# Patient Record
Sex: MALE | Race: WHITE | NOT HISPANIC OR LATINO
[De-identification: names, ages, dates, MRNs, and addresses within clinical notes are randomized per-mention and may not be internally consistent; named-entity substitution may affect disease eponyms.]

---

## 2018-11-15 PROBLEM — Z00.00 ENCOUNTER FOR PREVENTIVE HEALTH EXAMINATION: Status: ACTIVE | Noted: 2018-11-15

## 2018-11-27 ENCOUNTER — RECORD ABSTRACTING (OUTPATIENT)
Age: 73
End: 2018-11-27

## 2018-11-28 ENCOUNTER — APPOINTMENT (OUTPATIENT)
Dept: CARDIOLOGY | Facility: CLINIC | Age: 73
End: 2018-11-28
Payer: MEDICARE

## 2018-11-28 VITALS
SYSTOLIC BLOOD PRESSURE: 142 MMHG | WEIGHT: 173 LBS | BODY MASS INDEX: 27.8 KG/M2 | OXYGEN SATURATION: 97 % | DIASTOLIC BLOOD PRESSURE: 80 MMHG | HEIGHT: 66 IN | HEART RATE: 86 BPM

## 2018-11-28 LAB
INR PPP: 2.7
PROTHROMBIN TIME COMMENT: NORMAL

## 2018-11-28 PROCEDURE — 99213 OFFICE O/P EST LOW 20 MIN: CPT

## 2018-11-28 PROCEDURE — 85610 PROTHROMBIN TIME: CPT | Mod: QW

## 2018-11-28 RX ORDER — GLIMEPIRIDE 1 MG/1
1 TABLET ORAL DAILY
Refills: 0 | Status: DISCONTINUED | COMMUNITY
End: 2018-08-14

## 2018-11-30 NOTE — ASSESSMENT
[FreeTextEntry1] : Rate control and anticoagulation for atrial fibrillation. Continue current Coumadin dose\par \par BP acceptable. Continue current treatment

## 2018-11-30 NOTE — PHYSICAL EXAM
[Normal Appearance] : normal appearance [Auscultation Breath Sounds / Voice Sounds] : lungs were clear to auscultation bilaterally [Heart Sounds] : normal S1 and S2 [Abnormal Walk] : normal gait [] : no ischemic changes [FreeTextEntry1] : no evidence of bleeding or bruising

## 2018-12-19 ENCOUNTER — APPOINTMENT (OUTPATIENT)
Dept: CARDIOLOGY | Facility: CLINIC | Age: 73
End: 2018-12-19

## 2019-01-16 ENCOUNTER — RECORD ABSTRACTING (OUTPATIENT)
Age: 74
End: 2019-01-16

## 2019-01-16 DIAGNOSIS — Z83.3 FAMILY HISTORY OF DIABETES MELLITUS: ICD-10-CM

## 2019-01-16 DIAGNOSIS — Z86.79 PERSONAL HISTORY OF OTHER DISEASES OF THE CIRCULATORY SYSTEM: ICD-10-CM

## 2019-01-16 DIAGNOSIS — Z87.891 PERSONAL HISTORY OF NICOTINE DEPENDENCE: ICD-10-CM

## 2019-01-16 DIAGNOSIS — R51 HEADACHE: ICD-10-CM

## 2019-01-18 ENCOUNTER — APPOINTMENT (OUTPATIENT)
Dept: CARDIOLOGY | Facility: CLINIC | Age: 74
End: 2019-01-18

## 2019-02-19 ENCOUNTER — RECORD ABSTRACTING (OUTPATIENT)
Age: 74
End: 2019-02-19

## 2019-02-20 ENCOUNTER — APPOINTMENT (OUTPATIENT)
Dept: CARDIOLOGY | Facility: CLINIC | Age: 74
End: 2019-02-20
Payer: MEDICARE

## 2019-02-20 ENCOUNTER — NON-APPOINTMENT (OUTPATIENT)
Age: 74
End: 2019-02-20

## 2019-02-20 VITALS
HEART RATE: 72 BPM | BODY MASS INDEX: 27.48 KG/M2 | SYSTOLIC BLOOD PRESSURE: 142 MMHG | DIASTOLIC BLOOD PRESSURE: 62 MMHG | WEIGHT: 171 LBS | HEIGHT: 66 IN

## 2019-02-20 LAB — INR PPP: 2.7

## 2019-02-20 PROCEDURE — 93000 ELECTROCARDIOGRAM COMPLETE: CPT

## 2019-02-20 PROCEDURE — 99214 OFFICE O/P EST MOD 30 MIN: CPT

## 2019-02-20 PROCEDURE — 85610 PROTHROMBIN TIME: CPT | Mod: QW

## 2019-02-20 RX ORDER — LOSARTAN POTASSIUM 100 MG/1
100 TABLET, FILM COATED ORAL
Refills: 0 | Status: DISCONTINUED | COMMUNITY
End: 2019-02-20

## 2019-02-20 RX ORDER — GLIMEPIRIDE 1 MG/1
1 TABLET ORAL
Refills: 0 | Status: DISCONTINUED | COMMUNITY
End: 2019-02-20

## 2019-02-20 NOTE — PHYSICAL EXAM
[Normal Conjunctiva] : the conjunctiva exhibited no abnormalities [Eyelids - No Xanthelasma] : the eyelids demonstrated no xanthelasmas [Normal Oral Mucosa] : normal oral mucosa [No Oral Pallor] : no oral pallor [No Oral Cyanosis] : no oral cyanosis [Normal Jugular Venous A Waves Present] : normal jugular venous A waves present [Normal Jugular Venous V Waves Present] : normal jugular venous V waves present [No Jugular Venous Randall A Waves] : no jugular venous randall A waves [Respiration, Rhythm And Depth] : normal respiratory rhythm and effort [Exaggerated Use Of Accessory Muscles For Inspiration] : no accessory muscle use [Auscultation Breath Sounds / Voice Sounds] : lungs were clear to auscultation bilaterally [Abdomen Soft] : soft [Abdomen Tenderness] : non-tender [Abdomen Mass (___ Cm)] : no abdominal mass palpated [Abnormal Walk] : normal gait [Gait - Sufficient For Exercise Testing] : the gait was sufficient for exercise testing [Nail Clubbing] : no clubbing of the fingernails [Cyanosis, Localized] : no localized cyanosis [Petechial Hemorrhages (___cm)] : no petechial hemorrhages [Skin Color & Pigmentation] : normal skin color and pigmentation [] : no rash [No Venous Stasis] : no venous stasis [Skin Lesions] : no skin lesions [No Skin Ulcers] : no skin ulcer [No Xanthoma] : no  xanthoma was observed [Oriented To Time, Place, And Person] : oriented to person, place, and time [Affect] : the affect was normal [Mood] : the mood was normal [No Anxiety] : not feeling anxious [FreeTextEntry1] : Normal S1S2. Faint 1/6 systolic murmur left sternal border.

## 2019-02-20 NOTE — REASON FOR VISIT
[FreeTextEntry1] : Mr. Todd Roberts presents for cardiovascular followup examination.\par \par His problem list includes chronic atrial fibrillation, anticoagulation management, hypertension, type 2 diabetes, chronic kidney disease, peripheral vascular disease, valve/echo abnormalities, dyslipidemia, erectile dysfunction.\par \par He has additional medical problems as noted.\par \par His primary care physician is Doctor Leo.\par \par

## 2019-02-20 NOTE — REVIEW OF SYSTEMS
[see HPI] : see HPI [Negative] : Integumentary [Easy Bleeding] : no tendency for easy bleeding [Easy Bruising] : no tendency for easy bruising

## 2019-02-20 NOTE — DISCUSSION/SUMMARY
[FreeTextEntry1] : Atrial fibrillation treated with heart rate control and anticoagulation.\par INR goal 2-3\par Clinically stable.\par \par Hypertension.\par Satisfactory control.\par \par Dyslipidemia\par Completely statin intolerant despite multiple trials and Zetia intolerance.\par His lipid profile is "not bad" and he will focus on therapeutic lifestyle treatment.\par PCSK9 inhibitor therapy is an option to discuss.\par \par Valve/echo abnormalities.\par The last performed in March of 2018. Mitral thickening with mild mitral deficiency. Trivial aortic sclerosis. I anticipate a followup study after his next visit.\par \par Peripheral vascular disease of lower extremities.\par Mild-moderate right infrapopliteal disease and left posterior tibial disease.\par He has both orthopedic and vascular limitations.\par He is anticipating epidural injection. Hopefully he can exercise more. If necessary consider interventional radiology procedures.\par \par Chronic kidney disease.\par He has seen a nephrologist in the past and I recommend that he do so on a yearly basis, coordinated with his primary care physicians. He also has chronic urinary frequency/nocturia and has seen a urologist.\par \par Additional multiple medical problems as noted including diabetes, erectile dysfunction, current orthopedic/spine abnormalities.\par He is anticipating an epidural injection. He has been given instructions to hold his Coumadin for 5 days prior to an epidural. He is done this before satisfactorily. I offered to the patient I would be pleased to speak to the interventional specialist.\par \par

## 2019-02-20 NOTE — HISTORY OF PRESENT ILLNESS
[FreeTextEntry1] : This 73-year-old male patient presents for cardiovascular evaluation. His problem list is as noted above.\par \par

## 2019-02-20 NOTE — ASSESSMENT
[FreeTextEntry1] : EKG 2/20/19. Atrial fibrillation. Controlled. Poor R wave progression. No acute changes.

## 2019-02-25 ENCOUNTER — RX RENEWAL (OUTPATIENT)
Age: 74
End: 2019-02-25

## 2019-03-20 ENCOUNTER — APPOINTMENT (OUTPATIENT)
Dept: CARDIOLOGY | Facility: CLINIC | Age: 74
End: 2019-03-20

## 2019-03-28 ENCOUNTER — APPOINTMENT (OUTPATIENT)
Dept: CARDIOLOGY | Facility: CLINIC | Age: 74
End: 2019-03-28
Payer: MEDICARE

## 2019-03-28 VITALS — SYSTOLIC BLOOD PRESSURE: 130 MMHG | WEIGHT: 164 LBS | DIASTOLIC BLOOD PRESSURE: 80 MMHG | BODY MASS INDEX: 26.47 KG/M2

## 2019-03-28 LAB — INR PPP: 1.8 RATIO

## 2019-03-28 PROCEDURE — 85610 PROTHROMBIN TIME: CPT | Mod: QW

## 2019-03-28 PROCEDURE — 99211 OFF/OP EST MAY X REQ PHY/QHP: CPT

## 2019-04-25 ENCOUNTER — APPOINTMENT (OUTPATIENT)
Dept: CARDIOLOGY | Facility: CLINIC | Age: 74
End: 2019-04-25
Payer: MEDICARE

## 2019-04-25 VITALS
SYSTOLIC BLOOD PRESSURE: 126 MMHG | WEIGHT: 169 LBS | BODY MASS INDEX: 27.28 KG/M2 | DIASTOLIC BLOOD PRESSURE: 80 MMHG | HEART RATE: 70 BPM

## 2019-04-25 LAB — INR PPP: 3.7 RATIO

## 2019-04-25 PROCEDURE — 85610 PROTHROMBIN TIME: CPT | Mod: QW

## 2019-04-25 PROCEDURE — 99213 OFFICE O/P EST LOW 20 MIN: CPT

## 2019-04-25 NOTE — HISTORY OF PRESENT ILLNESS
[FreeTextEntry1] : His problem list includes chronic atrial fibrillation, anticoagulation management, hypertension, type 2 diabetes, chronic kidney disease, peripheral vascular disease, valve/echo abnormalities, dyslipidemia, erectile dysfunction.\par

## 2019-04-25 NOTE — REVIEW OF SYSTEMS
[Feeling Fatigued] : not feeling fatigued [Shortness Of Breath] : no shortness of breath [Lower Ext Edema] : no extremity edema [Chest Pain] : no chest pain [Palpitations] : no palpitations [Cough] : no cough [Joint Pain] : no joint pain [Abdominal Pain] : no abdominal pain [Limb Weakness (Paresis)] : no limb weakness [Numbness (Hypesthesia)] : no numbness [Dizziness] : no dizziness [Easy Bleeding] : no tendency for easy bleeding [Easy Bruising] : a tendency for easy bruising

## 2019-04-25 NOTE — ASSESSMENT
[FreeTextEntry1] : Rate control and anticoagulation for atrial fibrillation. Elevation of INR recommend hold 1 day then decrease to 2.5mg daily.  Recheck in 3 weeks\par \par BP controlled. Continue current treatment

## 2019-04-25 NOTE — PHYSICAL EXAM
[Auscultation Breath Sounds / Voice Sounds] : lungs were clear to auscultation bilaterally [Normal Appearance] : normal appearance [Heart Sounds] : normal S1 and S2 [Abnormal Walk] : normal gait [] : no ischemic changes [FreeTextEntry1] : no evidence of bleeding or bruising

## 2019-04-25 NOTE — REASON FOR VISIT
[Anticoagulation] : anticoagulation [Atrial Fibrillation] : atrial fibrillation [FreeTextEntry1] : On Coumadin for Afib, notes increase bruising the last few days, came in early for INR check\par \par \par

## 2019-05-16 ENCOUNTER — APPOINTMENT (OUTPATIENT)
Dept: CARDIOLOGY | Facility: CLINIC | Age: 74
End: 2019-05-16
Payer: MEDICARE

## 2019-05-16 VITALS — SYSTOLIC BLOOD PRESSURE: 120 MMHG | WEIGHT: 166 LBS | BODY MASS INDEX: 26.79 KG/M2 | DIASTOLIC BLOOD PRESSURE: 70 MMHG

## 2019-05-16 LAB — INR PPP: 1.9 RATIO

## 2019-05-16 PROCEDURE — 99213 OFFICE O/P EST LOW 20 MIN: CPT

## 2019-05-16 PROCEDURE — 85610 PROTHROMBIN TIME: CPT | Mod: QW

## 2019-05-16 NOTE — ASSESSMENT
[FreeTextEntry1] : Rate control and anticoagulation for atrial fibrillation. \par \par INR 1.9 today. Continue 2.5mg daily. Stabilize Vitamin K in diet. Recheck in 3 weeks\par \par BP controlled. Continue current treatment

## 2019-05-16 NOTE — REVIEW OF SYSTEMS
[Easy Bruising] : a tendency for easy bruising [Feeling Fatigued] : not feeling fatigued [Shortness Of Breath] : no shortness of breath [Chest Pain] : no chest pain [Lower Ext Edema] : no extremity edema [Palpitations] : no palpitations [Cough] : no cough [Joint Pain] : no joint pain [Abdominal Pain] : no abdominal pain [Numbness (Hypesthesia)] : no numbness [Dizziness] : no dizziness [Limb Weakness (Paresis)] : no limb weakness [Easy Bleeding] : no tendency for easy bleeding

## 2019-05-16 NOTE — REASON FOR VISIT
[Anticoagulation] : anticoagulation [Atrial Fibrillation] : atrial fibrillation [FreeTextEntry1] : On Coumadin for Afib, came in for INR check. Denies bleeding or bruising.\par \par \par

## 2019-06-05 ENCOUNTER — APPOINTMENT (OUTPATIENT)
Dept: CARDIOLOGY | Facility: CLINIC | Age: 74
End: 2019-06-05
Payer: MEDICARE

## 2019-06-05 VITALS
HEART RATE: 68 BPM | WEIGHT: 167 LBS | DIASTOLIC BLOOD PRESSURE: 70 MMHG | BODY MASS INDEX: 26.95 KG/M2 | SYSTOLIC BLOOD PRESSURE: 130 MMHG

## 2019-06-05 LAB — INR PPP: 1.7 RATIO

## 2019-06-05 PROCEDURE — 85610 PROTHROMBIN TIME: CPT | Mod: QW

## 2019-06-05 PROCEDURE — 99213 OFFICE O/P EST LOW 20 MIN: CPT

## 2019-06-05 NOTE — REASON FOR VISIT
[Anticoagulation] : anticoagulation [Atrial Fibrillation] : atrial fibrillation [FreeTextEntry1] : On Coumadin for Afib, here for INR check. Denies bleeding or bruising.\par \par \par

## 2019-06-05 NOTE — REVIEW OF SYSTEMS
[Feeling Fatigued] : not feeling fatigued [Shortness Of Breath] : no shortness of breath [Chest Pain] : no chest pain [Lower Ext Edema] : no extremity edema [Palpitations] : no palpitations [Cough] : no cough [Abdominal Pain] : no abdominal pain [Limb Weakness (Paresis)] : no limb weakness [Joint Pain] : no joint pain [Dizziness] : no dizziness [Numbness (Hypesthesia)] : no numbness [Easy Bleeding] : no tendency for easy bleeding [Easy Bruising] : no tendency for easy bruising

## 2019-06-05 NOTE — ASSESSMENT
[FreeTextEntry1] : Rate control and anticoagulation for atrial fibrillation. \par \par INR 1.7 today, return to previous dose of 5mg 1d 2.5mg 6d, Vitamin K intake discussed\par \par BP controlled. Continue current treatment

## 2019-06-24 ENCOUNTER — APPOINTMENT (OUTPATIENT)
Dept: CARDIOLOGY | Facility: CLINIC | Age: 74
End: 2019-06-24

## 2019-09-06 ENCOUNTER — APPOINTMENT (OUTPATIENT)
Dept: CARDIOLOGY | Facility: CLINIC | Age: 74
End: 2019-09-06
Payer: MEDICARE

## 2019-09-06 ENCOUNTER — NON-APPOINTMENT (OUTPATIENT)
Age: 74
End: 2019-09-06

## 2019-09-06 VITALS
HEART RATE: 93 BPM | HEIGHT: 66 IN | DIASTOLIC BLOOD PRESSURE: 68 MMHG | SYSTOLIC BLOOD PRESSURE: 118 MMHG | BODY MASS INDEX: 26.36 KG/M2 | WEIGHT: 164 LBS

## 2019-09-06 LAB — INR PPP: 3.7 RATIO

## 2019-09-06 PROCEDURE — 99214 OFFICE O/P EST MOD 30 MIN: CPT

## 2019-09-06 PROCEDURE — 93000 ELECTROCARDIOGRAM COMPLETE: CPT

## 2019-09-06 PROCEDURE — 85610 PROTHROMBIN TIME: CPT | Mod: QW

## 2019-09-06 NOTE — ASSESSMENT
[FreeTextEntry1] : EKG 9/6/2019.  Atrial fibrillation.  Controlled.  No acute changes.\par EKG 2/20/19. Atrial fibrillation. Controlled. Poor R wave progression. No acute changes.

## 2019-09-06 NOTE — DISCUSSION/SUMMARY
[FreeTextEntry1] : Brief recommendations and follow-up: (see above for details)\par \par Continue current cardiac routine.\par Anticoagulation management, recheck in 2 weeks.  Refer to flowsheet.\par Echocardiogram ordered.\par At the patient's request I ordered comprehensive blood work.

## 2019-09-06 NOTE — HISTORY OF PRESENT ILLNESS
[FreeTextEntry1] : This 74 year-old male patient presents for cardiovascular evaluation.\par \par His problem list is as noted above.\par \par Since his last examination he reports no major events or hospitalizations.  He is active and exercising regularly.  There are no acute symptoms of shortness of breath, chest discomfort, palpitation, lightheadedness.\par \par He is followed regularly by our nurse practitioners for anticoagulation management.\par

## 2019-09-06 NOTE — REASON FOR VISIT
[FreeTextEntry1] : Mr. TIAN TOBIAS has the following problem list.\par \par Atrial fibrillation\par Anticoagulation management\par Hypertension\par Type 2 diabetes\par Chronic kidney disease\par Peripheral vascular disease\par Echo/valve abnormalities\par Dyslipidemia\par Erectile dysfunction\par \par He has additional medical problems as noted.\par \par His primary care physician is Doctor Leo.\par \par

## 2019-09-09 ENCOUNTER — RX RENEWAL (OUTPATIENT)
Age: 74
End: 2019-09-09

## 2019-09-13 ENCOUNTER — LABORATORY RESULT (OUTPATIENT)
Age: 74
End: 2019-09-13

## 2019-09-16 ENCOUNTER — RESULT REVIEW (OUTPATIENT)
Age: 74
End: 2019-09-16

## 2019-09-20 ENCOUNTER — APPOINTMENT (OUTPATIENT)
Dept: CARDIOLOGY | Facility: CLINIC | Age: 74
End: 2019-09-20
Payer: MEDICARE

## 2019-09-20 LAB — INR PPP: 1.8 RATIO

## 2019-09-20 PROCEDURE — 85610 PROTHROMBIN TIME: CPT | Mod: QW

## 2019-09-20 NOTE — REASON FOR VISIT
[Anticoagulation] : anticoagulation [Atrial Fibrillation] : atrial fibrillation [FreeTextEntry1] : At last visit, Mr. Roberts's INR was supratherapeutic, 3.7. Coumadin dose adjusted to 2.5mg daily. Returns for repeat INR. \par \par Reports that he held last night's Coumadin after cutting his foot while doing yard work. He had some bleeding from the cut which resolved. He did not seek medical attention. \par \par Reports easy bruising. Denies hematuria, hemoptysis or other signs of bleeding. Denies chest pain, SOB, palpitations, dizziness or syncope.

## 2019-09-20 NOTE — PHYSICAL EXAM
[Well Groomed] : well groomed [General Appearance - In No Acute Distress] : no acute distress [Edema] : no peripheral edema present [Cyanosis, Localized] : no localized cyanosis [Abnormal Walk] : normal gait [FreeTextEntry1] : Right foot no s/s of bleeding  [Oriented To Time, Place, And Person] : oriented to person, place, and time [] : no rash [Impaired Insight] : insight and judgment were intact [Affect] : the affect was normal

## 2019-09-20 NOTE — HISTORY OF PRESENT ILLNESS
[FreeTextEntry1] : 74 year old male with chronic atrial fibrillation on Coumadin, hypertension, type 2 diabetes, chronic kidney disease, peripheral vascular disease, dyslipidemia, erectile dysfunction.\par

## 2019-10-07 ENCOUNTER — APPOINTMENT (OUTPATIENT)
Dept: CARDIOLOGY | Facility: CLINIC | Age: 74
End: 2019-10-07

## 2019-10-09 ENCOUNTER — RESULT REVIEW (OUTPATIENT)
Age: 74
End: 2019-10-09

## 2019-10-16 ENCOUNTER — APPOINTMENT (OUTPATIENT)
Dept: CARDIOLOGY | Facility: CLINIC | Age: 74
End: 2019-10-16
Payer: MEDICARE

## 2019-10-16 VITALS
HEART RATE: 68 BPM | DIASTOLIC BLOOD PRESSURE: 60 MMHG | WEIGHT: 167 LBS | SYSTOLIC BLOOD PRESSURE: 110 MMHG | BODY MASS INDEX: 26.95 KG/M2

## 2019-10-16 LAB — INR PPP: 2.3 RATIO

## 2019-10-16 PROCEDURE — 99213 OFFICE O/P EST LOW 20 MIN: CPT

## 2019-10-16 PROCEDURE — 85610 PROTHROMBIN TIME: CPT | Mod: QW

## 2019-10-16 NOTE — REVIEW OF SYSTEMS
[Feeling Fatigued] : not feeling fatigued [Shortness Of Breath] : no shortness of breath [Chest Pain] : no chest pain [Lower Ext Edema] : no extremity edema [Palpitations] : no palpitations [Cough] : no cough [Abdominal Pain] : no abdominal pain [Joint Pain] : no joint pain [Limb Weakness (Paresis)] : no limb weakness [Dizziness] : no dizziness [Numbness (Hypesthesia)] : no numbness [Easy Bleeding] : no tendency for easy bleeding [Easy Bruising] : no tendency for easy bruising

## 2019-10-16 NOTE — PHYSICAL EXAM
[Normal Appearance] : normal appearance [Auscultation Breath Sounds / Voice Sounds] : lungs were clear to auscultation bilaterally [Heart Sounds] : normal S1 and S2 [Abnormal Walk] : normal gait [FreeTextEntry1] : no evidence of bleeding or bruising

## 2019-11-13 ENCOUNTER — APPOINTMENT (OUTPATIENT)
Dept: CARDIOLOGY | Facility: CLINIC | Age: 74
End: 2019-11-13

## 2019-12-06 ENCOUNTER — APPOINTMENT (OUTPATIENT)
Dept: CARDIOLOGY | Facility: CLINIC | Age: 74
End: 2019-12-06
Payer: MEDICARE

## 2019-12-06 VITALS
BODY MASS INDEX: 27.44 KG/M2 | DIASTOLIC BLOOD PRESSURE: 60 MMHG | SYSTOLIC BLOOD PRESSURE: 120 MMHG | WEIGHT: 170 LBS | HEART RATE: 67 BPM

## 2019-12-06 LAB — INR PPP: 2.6 RATIO

## 2019-12-06 PROCEDURE — 99213 OFFICE O/P EST LOW 20 MIN: CPT

## 2019-12-06 PROCEDURE — 85610 PROTHROMBIN TIME: CPT | Mod: QW

## 2019-12-06 NOTE — REVIEW OF SYSTEMS
[Shortness Of Breath] : no shortness of breath [Feeling Fatigued] : not feeling fatigued [Lower Ext Edema] : no extremity edema [Chest Pain] : no chest pain [Abdominal Pain] : no abdominal pain [Palpitations] : no palpitations [Cough] : no cough [Limb Weakness (Paresis)] : no limb weakness [Joint Pain] : no joint pain [Dizziness] : no dizziness [Easy Bruising] : no tendency for easy bruising [Easy Bleeding] : no tendency for easy bleeding [Numbness (Hypesthesia)] : no numbness

## 2019-12-06 NOTE — REASON FOR VISIT
[FreeTextEntry1] : On Coumadin for Afib, here for INR check. Denies bleeding or bruising.\par \par \par  [Anticoagulation] : anticoagulation [Atrial Fibrillation] : atrial fibrillation

## 2020-01-07 ENCOUNTER — APPOINTMENT (OUTPATIENT)
Dept: CARDIOLOGY | Facility: CLINIC | Age: 75
End: 2020-01-07
Payer: MEDICARE

## 2020-01-07 VITALS
DIASTOLIC BLOOD PRESSURE: 70 MMHG | SYSTOLIC BLOOD PRESSURE: 136 MMHG | BODY MASS INDEX: 26.72 KG/M2 | HEART RATE: 99 BPM | WEIGHT: 166.25 LBS | HEIGHT: 66 IN | OXYGEN SATURATION: 98 %

## 2020-01-07 LAB — INR PPP: 2.1 RATIO

## 2020-01-07 PROCEDURE — 85610 PROTHROMBIN TIME: CPT | Mod: QW

## 2020-01-07 PROCEDURE — 99213 OFFICE O/P EST LOW 20 MIN: CPT

## 2020-01-07 NOTE — REASON FOR VISIT
[Anticoagulation] : anticoagulation [Atrial Fibrillation] : atrial fibrillation [FreeTextEntry1] : On Coumadin for atrial fibrillation. Mr. Roberts reports easy bruising. He denies s/s of bleeding. No c/o chest pain, SOB, palpitations, dizziness or syncope.

## 2020-01-07 NOTE — REVIEW OF SYSTEMS
[Easy Bleeding] : a tendency for easy bleeding [Easy Bruising] : a tendency for easy bruising [Headache] : no headache [Fever] : no fever [Recent Weight Gain (___ Lbs)] : no recent weight gain [Chills] : no chills [Feeling Fatigued] : not feeling fatigued [Recent Weight Loss (___ Lbs)] : no recent weight loss [Dyspnea on exertion] : not dyspnea during exertion [Shortness Of Breath] : no shortness of breath [Chest  Pressure] : no chest pressure [Chest Pain] : no chest pain [Lower Ext Edema] : no extremity edema [Palpitations] : no palpitations [Cough] : no cough [Muscle Cramps] : no muscle cramps [Skin: A Rash] : no rash: [Anxiety] : no anxiety

## 2020-01-07 NOTE — PHYSICAL EXAM
[Well Groomed] : well groomed [General Appearance - In No Acute Distress] : no acute distress [Edema] : no peripheral edema present [Abnormal Walk] : normal gait [] : no rash [Oriented To Time, Place, And Person] : oriented to person, place, and time [Impaired Insight] : insight and judgment were intact [Affect] : the affect was normal [General Appearance - Well Developed] : well developed [Normal Appearance] : normal appearance [General Appearance - Well Nourished] : well nourished [Auscultation Breath Sounds / Voice Sounds] : lungs were clear to auscultation bilaterally [Irregularly Irregular] : the rhythm was irregularly irregular [Cyanosis, Localized] : no localized cyanosis

## 2020-02-05 ENCOUNTER — APPOINTMENT (OUTPATIENT)
Dept: CARDIOLOGY | Facility: CLINIC | Age: 75
End: 2020-02-05
Payer: MEDICARE

## 2020-02-05 VITALS
DIASTOLIC BLOOD PRESSURE: 70 MMHG | BODY MASS INDEX: 26.95 KG/M2 | HEART RATE: 74 BPM | WEIGHT: 167 LBS | SYSTOLIC BLOOD PRESSURE: 142 MMHG

## 2020-02-05 LAB — INR PPP: 2.6 RATIO

## 2020-02-05 PROCEDURE — 99213 OFFICE O/P EST LOW 20 MIN: CPT

## 2020-02-05 PROCEDURE — 85610 PROTHROMBIN TIME: CPT | Mod: QW

## 2020-02-05 NOTE — REASON FOR VISIT
[Atrial Fibrillation] : atrial fibrillation [Anticoagulation] : anticoagulation [FreeTextEntry1] : On Coumadin for Afib, here for INR check. Denies bleeding or bruising.\par \par \par

## 2020-02-05 NOTE — REVIEW OF SYSTEMS
[Feeling Fatigued] : not feeling fatigued [Shortness Of Breath] : no shortness of breath [Lower Ext Edema] : no extremity edema [Palpitations] : no palpitations [Chest Pain] : no chest pain [Cough] : no cough [Abdominal Pain] : no abdominal pain [Joint Pain] : no joint pain [Numbness (Hypesthesia)] : no numbness [Dizziness] : no dizziness [Limb Weakness (Paresis)] : no limb weakness [Easy Bleeding] : no tendency for easy bleeding [Easy Bruising] : no tendency for easy bruising

## 2020-03-09 ENCOUNTER — NON-APPOINTMENT (OUTPATIENT)
Age: 75
End: 2020-03-09

## 2020-03-09 ENCOUNTER — APPOINTMENT (OUTPATIENT)
Dept: CARDIOLOGY | Facility: CLINIC | Age: 75
End: 2020-03-09
Payer: MEDICARE

## 2020-03-09 VITALS
BODY MASS INDEX: 26.68 KG/M2 | SYSTOLIC BLOOD PRESSURE: 110 MMHG | WEIGHT: 166 LBS | DIASTOLIC BLOOD PRESSURE: 70 MMHG | HEIGHT: 66 IN | HEART RATE: 81 BPM

## 2020-03-09 LAB — INR PPP: 2.7 RATIO

## 2020-03-09 PROCEDURE — 93000 ELECTROCARDIOGRAM COMPLETE: CPT

## 2020-03-09 PROCEDURE — 99214 OFFICE O/P EST MOD 30 MIN: CPT

## 2020-03-09 NOTE — HISTORY OF PRESENT ILLNESS
[FreeTextEntry1] : This 74 year-old male patient presents for cardiovascular evaluation.\par \par His problem list is as noted above.\par \par Since his last examination 6 months ago he reports some medical interactions.  He has seen his primary care physician.  He is also seen a neurologist for left-sided sciatica.  Interventions are being considered.  He also seeing an orthopedic surgeon for right greater than left shoulder symptoms.\par \par Otherwise the patient is generally felt well.  He tries to stay active.  There have been no acute symptoms of shortness of breath, chest discomfort, palpitation, lightheadedness.\par \par He has been seen routinely in our office for anticoagulation management.\par

## 2020-03-09 NOTE — ASSESSMENT
[FreeTextEntry1] : EKG 3/9/2020.  Atrial fibrillation, controlled.  No acute changes.\par EKG 9/6/2019.  Atrial fibrillation.  Controlled.  No acute changes.\par EKG 2/20/19. Atrial fibrillation. Controlled. Poor R wave progression. No acute changes.

## 2020-03-09 NOTE — DISCUSSION/SUMMARY
[FreeTextEntry1] : Brief recommendations and follow-up: (see above for details)\par \par The patient's overall cardiovascular status is stable.\par His current limitations are associated with likely disc disease and left-sided sciatica.\par Continue risk factor reduction\par No change in anticoagulation regimen\par Next full cardiology visit 6 months\par 1 month nurse practitioner visit with INR\par I recommend that he speak with his primary care physician regarding a formal nephrology consultation.

## 2020-03-09 NOTE — PHYSICAL EXAM
[Normal Conjunctiva] : the conjunctiva exhibited no abnormalities [Normal Oral Mucosa] : normal oral mucosa [Eyelids - No Xanthelasma] : the eyelids demonstrated no xanthelasmas [No Oral Cyanosis] : no oral cyanosis [Normal Jugular Venous A Waves Present] : normal jugular venous A waves present [No Oral Pallor] : no oral pallor [Normal Jugular Venous V Waves Present] : normal jugular venous V waves present [No Jugular Venous Randall A Waves] : no jugular venous randall A waves [Respiration, Rhythm And Depth] : normal respiratory rhythm and effort [Exaggerated Use Of Accessory Muscles For Inspiration] : no accessory muscle use [Auscultation Breath Sounds / Voice Sounds] : lungs were clear to auscultation bilaterally [Abdomen Soft] : soft [Abdomen Tenderness] : non-tender [Abnormal Walk] : normal gait [Gait - Sufficient For Exercise Testing] : the gait was sufficient for exercise testing [Abdomen Mass (___ Cm)] : no abdominal mass palpated [Nail Clubbing] : no clubbing of the fingernails [Cyanosis, Localized] : no localized cyanosis [Petechial Hemorrhages (___cm)] : no petechial hemorrhages [Skin Color & Pigmentation] : normal skin color and pigmentation [Skin Lesions] : no skin lesions [No Skin Ulcers] : no skin ulcer [No Venous Stasis] : no venous stasis [] : no rash [Oriented To Time, Place, And Person] : oriented to person, place, and time [No Xanthoma] : no  xanthoma was observed [Affect] : the affect was normal [Mood] : the mood was normal [No Anxiety] : not feeling anxious [FreeTextEntry1] : Normal S1S2. Faint 1/6 systolic murmur left sternal border.

## 2020-04-08 ENCOUNTER — APPOINTMENT (OUTPATIENT)
Dept: CARDIOLOGY | Facility: CLINIC | Age: 75
End: 2020-04-08
Payer: MEDICARE

## 2020-04-08 ENCOUNTER — APPOINTMENT (OUTPATIENT)
Dept: CARDIOLOGY | Facility: CLINIC | Age: 75
End: 2020-04-08

## 2020-04-08 VITALS
BODY MASS INDEX: 25.82 KG/M2 | OXYGEN SATURATION: 97 % | HEART RATE: 103 BPM | SYSTOLIC BLOOD PRESSURE: 180 MMHG | WEIGHT: 160 LBS | DIASTOLIC BLOOD PRESSURE: 98 MMHG

## 2020-04-08 LAB — INR PPP: 1.8 RATIO

## 2020-04-08 PROCEDURE — 99213 OFFICE O/P EST LOW 20 MIN: CPT

## 2020-04-08 PROCEDURE — 85610 PROTHROMBIN TIME: CPT | Mod: QW

## 2020-04-08 NOTE — REVIEW OF SYSTEMS
[Palpitations] : palpitations [Negative] : Gastrointestinal [Recent Weight Gain (___ Lbs)] : no recent weight gain [Recent Weight Loss (___ Lbs)] : no recent weight loss [Shortness Of Breath] : no shortness of breath [Chest Pain] : no chest pain [Lower Ext Edema] : no extremity edema [Cough] : no cough [Abdominal Pain] : no abdominal pain [Joint Pain] : no joint pain [Limb Weakness (Paresis)] : no limb weakness [Dizziness] : no dizziness [Numbness (Hypesthesia)] : no numbness [Easy Bleeding] : no tendency for easy bleeding [Easy Bruising] : no tendency for easy bruising

## 2020-04-08 NOTE — REASON FOR VISIT
[Anticoagulation] : anticoagulation [Atrial Fibrillation] : atrial fibrillation [FreeTextEntry1] : Called office with complaint of increased heart rate, particularly at night.  Thinks BP is high, a lot of stress at home.  On top of COVID pandemic son is at [facility] with septicemia (non covid).  No chest pain or SOB\par On Coumadin for Afib,. Denies bleeding or bruising.\par \par \par

## 2020-04-27 ENCOUNTER — APPOINTMENT (OUTPATIENT)
Dept: CARDIOLOGY | Facility: CLINIC | Age: 75
End: 2020-04-27
Payer: MEDICARE

## 2020-04-27 VITALS — OXYGEN SATURATION: 98 % | HEART RATE: 90 BPM | SYSTOLIC BLOOD PRESSURE: 180 MMHG | DIASTOLIC BLOOD PRESSURE: 80 MMHG

## 2020-04-27 LAB — INR PPP: 3.2 RATIO

## 2020-04-27 PROCEDURE — 99213 OFFICE O/P EST LOW 20 MIN: CPT

## 2020-04-27 PROCEDURE — 85610 PROTHROMBIN TIME: CPT | Mod: QW

## 2020-04-27 NOTE — REASON FOR VISIT
[Anticoagulation] : anticoagulation [Hypertension] : hypertension [Atrial Fibrillation] : atrial fibrillation [FreeTextEntry1] : Previously called office with complaint of increased heart rate, particularly at night. a lot of stress at home.  On top of COVID pandemic son is at [facility] with septicemia (non covid). BP was levated last visit and , metoprolol 25mg added.  States he felt better with medication.  BP readings at home were improved, today feels stressed.  No chest pain or SOB\par On Coumadin for Afib,. Denies bleeding or bruising.\par \par \par

## 2020-04-27 NOTE — PHYSICAL EXAM
[Respiration, Rhythm And Depth] : normal respiratory rhythm and effort [Normal Appearance] : normal appearance [Heart Sounds] : normal S1 and S2 [Abnormal Walk] : normal gait [FreeTextEntry1] : no evidence of bleeding or bruising

## 2020-04-27 NOTE — REVIEW OF SYSTEMS
[Negative] : Gastrointestinal [Recent Weight Gain (___ Lbs)] : no recent weight gain [Shortness Of Breath] : no shortness of breath [Recent Weight Loss (___ Lbs)] : no recent weight loss [Palpitations] : no palpitations [Lower Ext Edema] : no extremity edema [Chest Pain] : no chest pain [Abdominal Pain] : no abdominal pain [Cough] : no cough [Limb Weakness (Paresis)] : no limb weakness [Joint Pain] : no joint pain [Numbness (Hypesthesia)] : no numbness [Dizziness] : no dizziness [Easy Bleeding] : no tendency for easy bleeding [Easy Bruising] : no tendency for easy bruising

## 2020-05-01 ENCOUNTER — RX CHANGE (OUTPATIENT)
Age: 75
End: 2020-05-01

## 2020-05-04 ENCOUNTER — RX CHANGE (OUTPATIENT)
Age: 75
End: 2020-05-04

## 2020-05-26 ENCOUNTER — TRANSCRIPTION ENCOUNTER (OUTPATIENT)
Age: 75
End: 2020-05-26

## 2020-05-27 ENCOUNTER — TRANSCRIPTION ENCOUNTER (OUTPATIENT)
Age: 75
End: 2020-05-27

## 2020-05-29 ENCOUNTER — APPOINTMENT (OUTPATIENT)
Dept: CARDIOLOGY | Facility: CLINIC | Age: 75
End: 2020-05-29
Payer: MEDICARE

## 2020-05-29 VITALS
HEART RATE: 92 BPM | SYSTOLIC BLOOD PRESSURE: 138 MMHG | OXYGEN SATURATION: 98 % | WEIGHT: 157 LBS | DIASTOLIC BLOOD PRESSURE: 70 MMHG | BODY MASS INDEX: 25.34 KG/M2

## 2020-05-29 LAB — INR PPP: 1.9 RATIO

## 2020-05-29 PROCEDURE — 99213 OFFICE O/P EST LOW 20 MIN: CPT

## 2020-05-29 PROCEDURE — 85610 PROTHROMBIN TIME: CPT | Mod: QW

## 2020-05-29 NOTE — REASON FOR VISIT
[Anticoagulation] : anticoagulation [FreeTextEntry1] : Comes today for repeat INR, he went to hospital with infected hemorrhoid  INR was 4, he held coumadin 2 days this week.  He is feeling after hemorrhoid evacuated. No antibiotics just suppository.  States his BP was low when checked\par \par Denies CP/SOB/palps  \par

## 2020-05-29 NOTE — REVIEW OF SYSTEMS
[Recent Weight Gain (___ Lbs)] : no recent weight gain [Recent Weight Loss (___ Lbs)] : no recent weight loss [Shortness Of Breath] : no shortness of breath [Chest Pain] : no chest pain [Lower Ext Edema] : no extremity edema [Palpitations] : no palpitations [Cough] : no cough [Abdominal Pain] : no abdominal pain [Joint Pain] : no joint pain [Limb Weakness (Paresis)] : no limb weakness [Dizziness] : no dizziness [Numbness (Hypesthesia)] : no numbness [Easy Bleeding] : no tendency for easy bleeding [Easy Bruising] : no tendency for easy bruising [Negative] : Gastrointestinal

## 2020-05-29 NOTE — PHYSICAL EXAM
[Normal Appearance] : normal appearance [Respiration, Rhythm And Depth] : normal respiratory rhythm and effort [Auscultation Breath Sounds / Voice Sounds] : lungs were clear to auscultation bilaterally [Heart Sounds] : normal S1 and S2 [Abnormal Walk] : normal gait [FreeTextEntry1] : no evidence of bleeding or bruising [Skin Color & Pigmentation] : normal skin color and pigmentation

## 2020-06-12 NOTE — REVIEW OF SYSTEMS
[Feeling Fatigued] : not feeling fatigued [Shortness Of Breath] : no shortness of breath [Chest Pain] : no chest pain [Lower Ext Edema] : no extremity edema [Palpitations] : no palpitations [Cough] : no cough [Abdominal Pain] : no abdominal pain [Joint Pain] : no joint pain [Limb Weakness (Paresis)] : no limb weakness [Dizziness] : no dizziness [Numbness (Hypesthesia)] : no numbness [Easy Bleeding] : no tendency for easy bleeding [Easy Bruising] : no tendency for easy bruising surgical bed

## 2020-06-15 ENCOUNTER — APPOINTMENT (OUTPATIENT)
Dept: CARDIOLOGY | Facility: CLINIC | Age: 75
End: 2020-06-15
Payer: MEDICARE

## 2020-06-15 VITALS
HEART RATE: 88 BPM | SYSTOLIC BLOOD PRESSURE: 142 MMHG | BODY MASS INDEX: 25.34 KG/M2 | WEIGHT: 157 LBS | DIASTOLIC BLOOD PRESSURE: 78 MMHG

## 2020-06-15 LAB — INR PPP: 2.7 RATIO

## 2020-06-15 PROCEDURE — 99213 OFFICE O/P EST LOW 20 MIN: CPT

## 2020-06-15 PROCEDURE — 85610 PROTHROMBIN TIME: CPT | Mod: QW

## 2020-06-15 NOTE — REASON FOR VISIT
[Anticoagulation] : anticoagulation [FreeTextEntry1] : Comes today for repeat INR, previously  went to hospital with infected hemorrhoid  INR was 4, he held coumadin  INR last week 1.8  He is feeling better after hemorrhoid evacuated. No antibiotics just suppository. \par Denies CP/SOB/palps  \par

## 2020-06-15 NOTE — PHYSICAL EXAM
[Normal Appearance] : normal appearance [Respiration, Rhythm And Depth] : normal respiratory rhythm and effort [Auscultation Breath Sounds / Voice Sounds] : lungs were clear to auscultation bilaterally [Heart Sounds] : normal S1 and S2 [Abnormal Walk] : normal gait [Skin Color & Pigmentation] : normal skin color and pigmentation [FreeTextEntry1] : no evidence of bleeding or bruising

## 2020-06-15 NOTE — REVIEW OF SYSTEMS
[Negative] : Gastrointestinal [Recent Weight Gain (___ Lbs)] : no recent weight gain [Recent Weight Loss (___ Lbs)] : no recent weight loss [Shortness Of Breath] : no shortness of breath [Chest Pain] : no chest pain [Lower Ext Edema] : no extremity edema [Palpitations] : no palpitations [Cough] : no cough [Abdominal Pain] : no abdominal pain [Joint Pain] : no joint pain [Dizziness] : no dizziness [Limb Weakness (Paresis)] : no limb weakness [Numbness (Hypesthesia)] : no numbness [Easy Bleeding] : no tendency for easy bleeding [Easy Bruising] : no tendency for easy bruising

## 2020-07-15 ENCOUNTER — APPOINTMENT (OUTPATIENT)
Dept: CARDIOLOGY | Facility: CLINIC | Age: 75
End: 2020-07-15
Payer: MEDICARE

## 2020-07-15 VITALS
SYSTOLIC BLOOD PRESSURE: 130 MMHG | HEART RATE: 76 BPM | WEIGHT: 158 LBS | BODY MASS INDEX: 25.5 KG/M2 | OXYGEN SATURATION: 98 % | DIASTOLIC BLOOD PRESSURE: 64 MMHG

## 2020-07-15 LAB — INR PPP: 1.9 RATIO

## 2020-07-15 PROCEDURE — 99213 OFFICE O/P EST LOW 20 MIN: CPT

## 2020-07-15 PROCEDURE — 85610 PROTHROMBIN TIME: CPT | Mod: QW

## 2020-07-15 NOTE — PHYSICAL EXAM
[Respiration, Rhythm And Depth] : normal respiratory rhythm and effort [Normal Appearance] : normal appearance [Auscultation Breath Sounds / Voice Sounds] : lungs were clear to auscultation bilaterally [Heart Sounds] : normal S1 and S2 [FreeTextEntry1] : irreg [Skin Color & Pigmentation] : normal skin color and pigmentation [Abnormal Walk] : normal gait

## 2020-07-15 NOTE — REASON FOR VISIT
[Anticoagulation] : anticoagulation [Follow-Up - Clinic] : a clinic follow-up of [FreeTextEntry1] : Comes today for repeat INR, reports mechanical fall with bruising to left hip extending down left leg.  INR checked with PMD it was 2.5.  previously  went to hospital with infected hemorrhoid  INR was 4, he held coumadin  I  He is feeling better after hemorrhoid evacuated. No antibiotics just suppository. \par Denies CP/SOB/palps , complains of weakness left leg - he will be seeing neurology for second opinion.  Has been told requires back procedure. \par

## 2020-07-15 NOTE — REVIEW OF SYSTEMS
[Recent Weight Loss (___ Lbs)] : no recent weight loss [Shortness Of Breath] : no shortness of breath [Chest Pain] : no chest pain [Recent Weight Gain (___ Lbs)] : no recent weight gain [Palpitations] : no palpitations [Cough] : no cough [Lower Ext Edema] : no extremity edema [Joint Pain] : no joint pain [Limb Weakness (Paresis)] : no limb weakness [Abdominal Pain] : no abdominal pain [Dizziness] : no dizziness [Easy Bleeding] : no tendency for easy bleeding [Numbness (Hypesthesia)] : no numbness [Easy Bruising] : no tendency for easy bruising [Negative] : Gastrointestinal

## 2020-07-23 ENCOUNTER — APPOINTMENT (OUTPATIENT)
Dept: NEPHROLOGY | Facility: CLINIC | Age: 75
End: 2020-07-23
Payer: MEDICARE

## 2020-07-23 ENCOUNTER — RESULT REVIEW (OUTPATIENT)
Age: 75
End: 2020-07-23

## 2020-07-23 VITALS
TEMPERATURE: 97.4 F | WEIGHT: 158 LBS | HEART RATE: 77 BPM | DIASTOLIC BLOOD PRESSURE: 70 MMHG | SYSTOLIC BLOOD PRESSURE: 160 MMHG | BODY MASS INDEX: 25.5 KG/M2 | OXYGEN SATURATION: 99 %

## 2020-07-23 DIAGNOSIS — R35.1 NOCTURIA: ICD-10-CM

## 2020-07-23 PROCEDURE — 36415 COLL VENOUS BLD VENIPUNCTURE: CPT

## 2020-07-23 PROCEDURE — 99205 OFFICE O/P NEW HI 60 MIN: CPT | Mod: 25

## 2020-07-23 NOTE — HISTORY OF PRESENT ILLNESS
[FreeTextEntry1] : 74 yo male acc by son who was referred by Dr. Leo for eval of elevated creatinine - pt unsure of how long he has had an elevated creatinine, under treatment for HTN for which he takes cardizem, losartan, metoprolol and is under treatment for DM, takes janumet. Also receives testosterone for hypogonadism. C/o urinating every 1-2 hours at night. \par \par \par children\par no etoh (occ glass of home made wine)\par no tobacco\par  no fhx of CKD, ESRD\par Cr ~ 1.5 3/2020, 1.7  9/2019\par UA trace protein\par \par US 6/2020 showed moderat prostatomegaly with intravesical protrusion and high pvr (94cc)\par no hydro no nephrolithiassi

## 2020-07-23 NOTE — PHYSICAL EXAM
[General Appearance - Alert] : alert [General Appearance - Well Nourished] : well nourished [General Appearance - In No Acute Distress] : in no acute distress [Extraocular Movements] : extraocular movements were intact [Outer Ear] : the ears and nose were normal in appearance [Sclera] : the sclera and conjunctiva were normal [Hearing Threshold Finger Rub Not [place]] : hearing was normal [Neck Appearance] : the appearance of the neck was normal [Heart Sounds] : normal S1 and S2 [Exaggerated Use Of Accessory Muscles For Inspiration] : no accessory muscle use [Apical Impulse] : the apical impulse was normal [Arterial Pulses Carotid] : carotid pulses were normal with no bruits [Edema] : there was no peripheral edema [Veins - Varicosity Changes] : there were no varicosital changes [Bowel Sounds] : normal bowel sounds [Abdomen Soft] : soft [No CVA Tenderness] : no ~M costovertebral angle tenderness [No Spinal Tenderness] : no spinal tenderness [Abnormal Walk] : normal gait [] : no rash [Skin Color & Pigmentation] : normal skin color and pigmentation [Musculoskeletal - Swelling] : no joint swelling seen [Affect] : the affect was normal [Oriented To Time, Place, And Person] : oriented to person, place, and time [Cranial Nerves] : cranial nerves 2-12 were intact [No Focal Deficits] : no focal deficits

## 2020-07-23 NOTE — REASON FOR VISIT
[Initial Evaluation] : an initial evaluation [Family Member] : family member [FreeTextEntry1] : eval for CKD , elevated cr

## 2020-07-23 NOTE — ASSESSMENT
[FreeTextEntry1] : 74 yo male with hx of "borderline DM", uncontrolled HTN, severe nocturia  and mod CKD ( cr ~1.5); due to nocturia and urinary frequency pt avoids fluids, so po fluid intake could  be better.\par \par Cr of 1.5 may reflect the effect of ARB. Do not have creatinine prior to 9/2019 and pt has been on arb for "years". Elevated cr also affected by fluid intake which he avoids in effort to minimize need to use restroom at night. Elevated creatinine  may also reflect poorly controlled HTN or even chronic DM, though latter appears to be well controlled (A1c 6.8).\par \par Pt denies snoring, apnea, confirmed by son,\par \par Suspect the difficulty controlling his  HTN may be related to lack of sleep - as previously stated he wakes every 1 to 2 hrs to void, US confirms enlarged bladder, and PVR was present (albeit ~90cc). Discuused starting flomax, but pt chart indicates he has an "allergy" to it, so he was strongly advised to f/u with his Urologist to be evaluated for green light laser surgery.  May find that once BPH is corrected the nocturia improves so that he can sleep without interruptions which will hopefully result in improved blood pressure.\par \par Will f/u in ~3 months after he sees Urologsit or sooner dep on labs.\par \par This was discussed with patient and son, all labs reviewed as was imaging study, meds, allergies, and plan of care

## 2020-07-30 ENCOUNTER — RESULT REVIEW (OUTPATIENT)
Age: 75
End: 2020-07-30

## 2020-07-30 ENCOUNTER — APPOINTMENT (OUTPATIENT)
Dept: NEPHROLOGY | Facility: CLINIC | Age: 75
End: 2020-07-30
Payer: MEDICARE

## 2020-07-30 DIAGNOSIS — Z11.59 ENCOUNTER FOR SCREENING FOR OTHER VIRAL DISEASES: ICD-10-CM

## 2020-07-30 PROCEDURE — 36415 COLL VENOUS BLD VENIPUNCTURE: CPT

## 2020-07-30 RX ORDER — HYDROCHLOROTHIAZIDE 25 MG/1
25 TABLET ORAL
Qty: 90 | Refills: 3 | Status: DISCONTINUED | COMMUNITY
Start: 2020-07-23 | End: 2020-07-30

## 2020-08-03 ENCOUNTER — APPOINTMENT (OUTPATIENT)
Dept: NEUROLOGY | Facility: CLINIC | Age: 75
End: 2020-08-03
Payer: MEDICARE

## 2020-08-03 VITALS — SYSTOLIC BLOOD PRESSURE: 146 MMHG | DIASTOLIC BLOOD PRESSURE: 97 MMHG | HEART RATE: 101 BPM

## 2020-08-03 VITALS
HEIGHT: 66 IN | HEART RATE: 87 BPM | BODY MASS INDEX: 25.39 KG/M2 | SYSTOLIC BLOOD PRESSURE: 153 MMHG | TEMPERATURE: 97.6 F | DIASTOLIC BLOOD PRESSURE: 96 MMHG | WEIGHT: 158 LBS

## 2020-08-03 DIAGNOSIS — M48.061 SPINAL STENOSIS, LUMBAR REGION WITHOUT NEUROGENIC CLAUDICATION: ICD-10-CM

## 2020-08-03 PROCEDURE — 99204 OFFICE O/P NEW MOD 45 MIN: CPT

## 2020-08-03 NOTE — PHYSICAL EXAM
[FreeTextEntry1] : Physical examination \par General: No acute distress, Awake, Alert.   \par \par \par Mental status \par Awake, alert, and gives detailed history. \par \par Motor exam  \par Muscle tone - no evidence of rigidity or resistance in all 4 extremities.  \par Left gastrocnemius atrophy and hamstring muscle atrophy. \par Muscle Strength: arms and legs, proximal and distal flexors and extensors are normal except \par left knee flexion 4\par left plantar flexion 4+\par \par No UE drift.\par \par Reflexes \par All present, normal, and symmetrical.   \par Absent ankle reflexes\par \par Plantars right: mute.   \par Plantars left: mute.   \par \par \par Sensory \par Intact sensation to cold.\par Reduced sensation to vibration MM B.\par \par \par Gait \par Impaired gait including heels, toes walk in the left foot, and tandem gait.  \par \par \par \par

## 2020-08-03 NOTE — HISTORY OF PRESENT ILLNESS
[FreeTextEntry1] : Todd Roberts is a 75 year old man with a history of CKD stage 3, DM, hypertension, and chronic atrial fibrillation on Coumadin.  He presents today for left leg weakness that has worsened since January.    He also reports cramping in his left leg.  He reports tingling to the left lower leg and numbness to his toes bilaterally. He denies any pain. \par \par Mr. Roberts saw another neurologist and neurosurgeon in the past.  He reports having a course of physical therapy with no relief. He had one epidural injection three years ago with relief of pain at that time.  He had an MRI done in January of his lumbar spine and an EMG done with his previous workup.\par \par Mr. Roberts reports being less active than his previous baseline. He has to stop and rest after a few blocks of walking for mild relief of his weakness and cramping of the left leg.\par \par He also reports tinnitus and plans to see ENT. \par The remaining neurological review of systems is negative.

## 2020-08-03 NOTE — ASSESSMENT
[FreeTextEntry1] : Todd Roberts is a 75 year old with spinal claudication secondary to severe lumbar stenosis. \par \par He already completed a course of physical therapy and pain management without relief.\par I recommend referral to a spine specialist - Dr. Kenroy Sorto for any minimally invasive options.\par \par F/U with cardiology, PMD for HTN. \par \par Follow up PRN.

## 2020-08-03 NOTE — CONSULT LETTER
[Dear  ___] : Dear  [unfilled], [FreeTextEntry1] : I had the pleasure of evaluating your patient, TIAN TOBIAS, Please see the assessment section below for a summary of my diagnostic impression and plan.\par \par Thank you very much for allowing me to participate in the care of this patient. If you have any questions, please do not hesitate to contact me.\par \par Sincerely,\par \par Divya Fagan MD\par \par \par  [DrChandan  ___] : Dr. BABB

## 2020-08-03 NOTE — DATA REVIEWED
[de-identified] : Outside EMG legs study: 2/3/20-this is an abnormal electrophysiological evaluation of the lower extremities. There is neurophysiological evidence of left L4 lumbar radiculopathy. \par \par 1/2/20: MRI lumbar spine: Severe central spinal stenosis L3-L4, L4-L5. Superimposed on disc  \par  bulging L3-L4 is a left disc herniation resulting in additional compression of  \par  the left L4 nerve root. Subarticular zone stenosis and foramen stenosis as  \par  described above. Minimal central disc protrusion L5-S1.  \par    \par  Findings consistent with chronic bladder outlet obstruction.

## 2020-08-04 ENCOUNTER — APPOINTMENT (OUTPATIENT)
Dept: CARDIOLOGY | Facility: CLINIC | Age: 75
End: 2020-08-04
Payer: MEDICARE

## 2020-08-04 VITALS — SYSTOLIC BLOOD PRESSURE: 144 MMHG | HEART RATE: 71 BPM | OXYGEN SATURATION: 98 % | DIASTOLIC BLOOD PRESSURE: 80 MMHG

## 2020-08-04 PROCEDURE — 99213 OFFICE O/P EST LOW 20 MIN: CPT

## 2020-08-04 NOTE — REVIEW OF SYSTEMS
[Negative] : Gastrointestinal [Recent Weight Gain (___ Lbs)] : no recent weight gain [Recent Weight Loss (___ Lbs)] : no recent weight loss [Shortness Of Breath] : no shortness of breath [Chest Pain] : no chest pain [Lower Ext Edema] : no extremity edema [Palpitations] : no palpitations [Cough] : no cough [Abdominal Pain] : no abdominal pain [Joint Pain] : no joint pain [Limb Weakness (Paresis)] : no limb weakness [Dizziness] : no dizziness [Numbness (Hypesthesia)] : no numbness [Easy Bleeding] : no tendency for easy bleeding [Easy Bruising] : no tendency for easy bruising

## 2020-08-04 NOTE — REASON FOR VISIT
[Follow-Up - Clinic] : a clinic follow-up of [Hypertension] : hypertension [FreeTextEntry1] : Comes today for eval due to c/o headache which he believes to be from medication diltiazem.  States he used to have headache from this med, then tolerated and now headache has returned.  His BP has also been running on high side at home.  Monitored by Dr Smith for renal insufficiency  K+ 5.5 has 1 week of HCTZ now on sodium polystyrene.\par \par Eval with Dr Fagan, refered to spinal specialist, also eval by ENT for declining hearing.  will be getting hearing aids

## 2020-08-07 ENCOUNTER — APPOINTMENT (OUTPATIENT)
Dept: NEPHROLOGY | Facility: CLINIC | Age: 75
End: 2020-08-07

## 2020-08-07 ENCOUNTER — APPOINTMENT (OUTPATIENT)
Dept: NEPHROLOGY | Facility: CLINIC | Age: 75
End: 2020-08-07
Payer: MEDICARE

## 2020-08-07 ENCOUNTER — RESULT REVIEW (OUTPATIENT)
Age: 75
End: 2020-08-07

## 2020-08-07 PROCEDURE — 36415 COLL VENOUS BLD VENIPUNCTURE: CPT

## 2020-08-10 ENCOUNTER — APPOINTMENT (OUTPATIENT)
Dept: CARDIOLOGY | Facility: CLINIC | Age: 75
End: 2020-08-10
Payer: MEDICARE

## 2020-08-10 VITALS — SYSTOLIC BLOOD PRESSURE: 140 MMHG | HEART RATE: 102 BPM | DIASTOLIC BLOOD PRESSURE: 90 MMHG | OXYGEN SATURATION: 99 %

## 2020-08-10 VITALS
DIASTOLIC BLOOD PRESSURE: 90 MMHG | WEIGHT: 152 LBS | HEART RATE: 102 BPM | OXYGEN SATURATION: 99 % | SYSTOLIC BLOOD PRESSURE: 140 MMHG | BODY MASS INDEX: 24.53 KG/M2

## 2020-08-10 PROCEDURE — 99213 OFFICE O/P EST LOW 20 MIN: CPT

## 2020-08-10 RX ORDER — LANSOPRAZOLE 30 MG/1
30 CAPSULE, DELAYED RELEASE ORAL
Refills: 0 | Status: DISCONTINUED | COMMUNITY
End: 2020-08-10

## 2020-08-10 RX ORDER — FLUOCINOLONE ACETONIDE 0.11 MG/ML
0.01 OIL AURICULAR (OTIC)
Qty: 20 | Refills: 0 | Status: DISCONTINUED | COMMUNITY
Start: 2020-08-04

## 2020-08-10 NOTE — REVIEW OF SYSTEMS
[Recent Weight Gain (___ Lbs)] : no recent weight gain [Recent Weight Loss (___ Lbs)] : no recent weight loss [Shortness Of Breath] : no shortness of breath [Chest Pain] : no chest pain [Palpitations] : no palpitations [Lower Ext Edema] : no extremity edema [Cough] : no cough [Abdominal Pain] : no abdominal pain [Joint Pain] : no joint pain [Limb Weakness (Paresis)] : no limb weakness [Dizziness] : no dizziness [Easy Bleeding] : no tendency for easy bleeding [Numbness (Hypesthesia)] : no numbness [Easy Bruising] : no tendency for easy bruising

## 2020-08-10 NOTE — REASON FOR VISIT
[FreeTextEntry1] : Comes  for repeat  eval due to c/o headache which he believes to be from medication diltiazem.  States he used to have headache from this med, then tolerated and now headache has returned.  His BP has also been running on high side at home.  Monitored by Dr Smith for renal insufficiency  K+ 5.5 has 1 week of HCTZ now on sodium polystyrene.  Last week diltiazem decreased and metoprolol increased.  Repeat labs 8/7 with Dr Smith kidney function stable, K+ 5.2  Today he reports he did not increase metoprolol to 50mg daily.  Continues with weight loss\par \par Eval with Dr Fagan, refered to spinal specialist, also eval by ENT for declining hearing.  will be getting hearing aids

## 2020-08-14 RX ORDER — TESTOSTERONE 12.5 MG/1
12.5 MG/ACT GEL, METERED TOPICAL
Refills: 0 | Status: COMPLETED | COMMUNITY
Start: 2019-02-20 | End: 2020-08-14

## 2020-08-19 ENCOUNTER — APPOINTMENT (OUTPATIENT)
Dept: CARDIOLOGY | Facility: CLINIC | Age: 75
End: 2020-08-19
Payer: MEDICARE

## 2020-08-19 VITALS
SYSTOLIC BLOOD PRESSURE: 120 MMHG | OXYGEN SATURATION: 97 % | BODY MASS INDEX: 24.27 KG/M2 | WEIGHT: 151 LBS | DIASTOLIC BLOOD PRESSURE: 66 MMHG | HEIGHT: 66 IN | HEART RATE: 79 BPM

## 2020-08-19 LAB — INR PPP: 2.1 RATIO

## 2020-08-19 PROCEDURE — 85610 PROTHROMBIN TIME: CPT | Mod: QW

## 2020-08-19 PROCEDURE — 36415 COLL VENOUS BLD VENIPUNCTURE: CPT

## 2020-08-19 PROCEDURE — 99213 OFFICE O/P EST LOW 20 MIN: CPT

## 2020-08-19 NOTE — HISTORY OF PRESENT ILLNESS
[FreeTextEntry1] : 75 year old male with chronic atrial fibrillation on Coumadin, hypertension, type 2 diabetes, chronic kidney disease, peripheral vascular disease, dyslipidemia, erectile dysfunction.\par

## 2020-08-19 NOTE — REASON FOR VISIT
[Anticoagulation] : anticoagulation [Follow-Up - Clinic] : a clinic follow-up of [Atrial Fibrillation] : atrial fibrillation [Hypertension] : hypertension [FreeTextEntry1] : Mr. Roberts presents for follow up visit. Accompanied by son Marcos.  \par \par Patient denies chest pain, SOB, palpitations, or syncope. He denies s/s of bleeding or easy bruising. He feels lightheaded at times. Home BP readings reviewed per daughter Nikia's report, ranging from 138/80 - 173/107 with this morning reading 136/95. \par \par \par

## 2020-08-19 NOTE — REVIEW OF SYSTEMS
[Lower Ext Edema] : lower extremity edema [Dizziness] : dizziness [Shortness Of Breath] : no shortness of breath [Palpitations] : no palpitations [Chest Pain] : no chest pain [Dyspnea on exertion] : not dyspnea during exertion [Easy Bleeding] : no tendency for easy bleeding [Confusion] : no confusion was observed [Easy Bruising] : no tendency for easy bruising

## 2020-08-19 NOTE — PHYSICAL EXAM
[Normal Appearance] : normal appearance [Well Groomed] : well groomed [General Appearance - In No Acute Distress] : no acute distress [] : no respiratory distress [Auscultation Breath Sounds / Voice Sounds] : lungs were clear to auscultation bilaterally [Respiration, Rhythm And Depth] : normal respiratory rhythm and effort [Irregularly Irregular] : the rhythm was irregularly irregular [Abnormal Walk] : normal gait [Systolic grade ___/6] : A grade [unfilled]/6 systolic murmur was heard. [Skin Color & Pigmentation] : normal skin color and pigmentation [Nail Clubbing] : no clubbing of the fingernails [Oriented To Time, Place, And Person] : oriented to person, place, and time [Impaired Insight] : insight and judgment were intact [Affect] : the affect was normal [FreeTextEntry1] : trace left ankle edema

## 2020-08-20 LAB
ANION GAP SERPL CALC-SCNC: 18 MMOL/L
BUN SERPL-MCNC: 60 MG/DL
CALCIUM SERPL-MCNC: 9.2 MG/DL
CHLORIDE SERPL-SCNC: 102 MMOL/L
CO2 SERPL-SCNC: 23 MMOL/L
CREAT SERPL-MCNC: 2.92 MG/DL
GLUCOSE SERPL-MCNC: 117 MG/DL
POTASSIUM SERPL-SCNC: 4.5 MMOL/L
SODIUM SERPL-SCNC: 143 MMOL/L

## 2020-08-21 ENCOUNTER — APPOINTMENT (OUTPATIENT)
Dept: NEUROLOGY | Facility: CLINIC | Age: 75
End: 2020-08-21
Payer: MEDICARE

## 2020-08-21 VITALS
BODY MASS INDEX: 24.27 KG/M2 | TEMPERATURE: 98.6 F | SYSTOLIC BLOOD PRESSURE: 175 MMHG | WEIGHT: 151 LBS | DIASTOLIC BLOOD PRESSURE: 89 MMHG | HEIGHT: 66 IN | HEART RATE: 97 BPM

## 2020-08-21 VITALS — SYSTOLIC BLOOD PRESSURE: 152 MMHG | HEART RATE: 83 BPM | DIASTOLIC BLOOD PRESSURE: 94 MMHG

## 2020-08-21 DIAGNOSIS — R46.89 OTHER SYMPTOMS AND SIGNS INVOLVING APPEARANCE AND BEHAVIOR: ICD-10-CM

## 2020-08-21 DIAGNOSIS — M48.062 SPINAL STENOSIS, LUMBAR REGION WITH NEUROGENIC CLAUDICATION: ICD-10-CM

## 2020-08-21 PROCEDURE — 99213 OFFICE O/P EST LOW 20 MIN: CPT

## 2020-08-21 NOTE — HISTORY OF PRESENT ILLNESS
[FreeTextEntry1] : History obtained from patient and son.\par \par Todd Roberts is a 75 year old man with history of CKD, DM, hypertension,and chronic atrial fibrillation on Coumadin presenting for a follow up after his MRI brain.\par \par He saw Dr. Kenroy Sorto and he is deciding on surgery for lumbar stenosis with neurogenic claudication.  \par \par One month ago he had intermittent ringing in the ears and difficulty with balance. He denies changes in speech, dysphagia, numbness or weakness to one side of the body or sudden severe headache. He has never had a clinical stroke. \par \par His son voiced concerns that he notes his dad to get agitated at times.  This is not new for him but may be more prominent lately.   He has not seen a psychiatrist or psychologist for this. \par \par Mr. Roberts reports losing weight. He is going to follow up with GI and his PCP. \par \par He does not smoke or drink alcohol. \par \par The remaining neurological review of systems is negative. \par   \par \par 8/3/20\par Todd Roberts is a 75 year old man with a history of CKD stage 3, DM, hypertension, and chronic atrial fibrillation on Coumadin.  He presents today for left leg weakness that has worsened since January.    He also reports cramping in his left leg.  He reports tingling to the left lower leg and numbness to his toes bilaterally. He denies any pain. \par \par Mr. Roberts saw another neurologist and neurosurgeon in the past.  He reports having a course of physical therapy with no relief. He had one epidural injection three years ago with relief of pain at that time.  He had an MRI done in January of his lumbar spine and an EMG done with his previous workup.\par \par Mr. Roberts reports being less active than his previous baseline. He has to stop and rest after a few blocks of walking for mild relief of his weakness and cramping of the left leg.\par \par He also reports tinnitus and plans to see ENT. \par The remaining neurological review of systems is negative.

## 2020-08-21 NOTE — PHYSICAL EXAM
[FreeTextEntry1] : Physical examination \par General: No acute distress, Awake, Alert.   \par \par \par Mental status \par Awake, alert, and gives detailed history. \par \par

## 2020-08-21 NOTE — ASSESSMENT
[FreeTextEntry1] : Todd Roberts is a 75 year old man with spinal claudication secondary to severe lumbar stenosis.  He already completed a course of physical therapy and pain management without relief.  He is considering surgery. \par \par I reviewed the images of the brain MRI - there are chronic small vessel ischemic changes and a chronic lacunar infarct which are common in patients of his age with multiple stroke risk factors. He is already on a statin, anticoagulation and antihypertensive medications for stroke risk reduction.  I explained the significance of these findings to the patient and his son in detail and they understand. \par \par He is seeing his PMD, cardiologist and GI for weight loss and management of risk factors.  \par \par Follow up PRN.

## 2020-08-21 NOTE — CONSULT LETTER
[Dear  ___] : Dear  [unfilled], [FreeTextEntry1] : I had the pleasure of evaluating your patient, TIAN TOBIAS. Please see the assessment section below for a summary of my diagnostic impression and plan.\par \par Thank you very much for allowing me to participate in the care of this patient. If you have any questions, please do not hesitate to contact me. \par \par Sincerely,\par \par Divya Fagan MD\par

## 2020-08-25 RX ORDER — LOSARTAN POTASSIUM 100 MG/1
100 TABLET, FILM COATED ORAL DAILY
Qty: 90 | Refills: 3 | Status: DISCONTINUED | COMMUNITY
Start: 1900-01-01 | End: 2020-08-25

## 2020-08-27 ENCOUNTER — RX CHANGE (OUTPATIENT)
Age: 75
End: 2020-08-27

## 2020-09-02 ENCOUNTER — APPOINTMENT (OUTPATIENT)
Dept: NEPHROLOGY | Facility: CLINIC | Age: 75
End: 2020-09-02
Payer: MEDICARE

## 2020-09-02 ENCOUNTER — RESULT REVIEW (OUTPATIENT)
Age: 75
End: 2020-09-02

## 2020-09-02 PROCEDURE — 36415 COLL VENOUS BLD VENIPUNCTURE: CPT

## 2020-09-02 PROCEDURE — P9615: CPT

## 2020-09-04 ENCOUNTER — APPOINTMENT (OUTPATIENT)
Dept: NEPHROLOGY | Facility: CLINIC | Age: 75
End: 2020-09-04
Payer: MEDICARE

## 2020-09-04 ENCOUNTER — RESULT REVIEW (OUTPATIENT)
Age: 75
End: 2020-09-04

## 2020-09-04 VITALS
HEIGHT: 66 IN | OXYGEN SATURATION: 98 % | WEIGHT: 147 LBS | TEMPERATURE: 97.9 F | BODY MASS INDEX: 23.63 KG/M2 | DIASTOLIC BLOOD PRESSURE: 88 MMHG | HEART RATE: 81 BPM | SYSTOLIC BLOOD PRESSURE: 122 MMHG

## 2020-09-04 DIAGNOSIS — N17.9 ACUTE KIDNEY FAILURE, UNSPECIFIED: ICD-10-CM

## 2020-09-04 PROCEDURE — 36415 COLL VENOUS BLD VENIPUNCTURE: CPT

## 2020-09-04 PROCEDURE — 99215 OFFICE O/P EST HI 40 MIN: CPT | Mod: 25

## 2020-09-04 RX ORDER — TADALAFIL 20 MG/1
20 TABLET, FILM COATED ORAL
Refills: 0 | Status: DISCONTINUED | COMMUNITY
End: 2020-09-04

## 2020-09-04 RX ORDER — WARFARIN 5 MG/1
5 TABLET ORAL DAILY
Qty: 90 | Refills: 3 | Status: DISCONTINUED | COMMUNITY
Start: 2019-02-25 | End: 2020-09-04

## 2020-09-04 NOTE — REASON FOR VISIT
[Initial Evaluation] : an initial evaluation [Family Member] : family member [FreeTextEntry1] : f/u for CKD , elevated cr, htn

## 2020-09-04 NOTE — HISTORY OF PRESENT ILLNESS
[FreeTextEntry1] : 74 yo male acc by dtr ( on intial visit was acc by son) who was initially referred by Dr. Leo and seen 7/2020 for eval of elevated creatinine; cr ~ 1.6- 1.9, bina to 2.9 last month and was instructed to stop \par losartan - cr improved from 2.9 to 1.7; \par \par BP well controlled\par Lost weight - no night sweats, no fevers\par \par Continues to have poor fluid intake and urnary retention  \par \par \par children\par no etoh (occ glass of home made wine)\par no tobacco\par  no fhx of CKD, ESRD\par Cr ~ 1.5 3/2020, 1.7  9/2019\par \par US 6/2020 showed moderate prostatomegaly with intravesical protrusion and high pvr (94cc)\par no hydro no nephrolithiasis

## 2020-09-04 NOTE — ASSESSMENT
[FreeTextEntry1] : 76 yo male with hx of "borderline DM", uncontrolled HTN, severe nocturia  and mod CKD ( cr ~1.5); due to nocturia and urinary frequency pt avoids fluids, so po fluid intake could  be better.\par \par 76 yo male acc by dtr ( on intial visit was acc by son) who was initially referred by Dr. Leo and seen 7/2020 for eval of elevated creatinine; cr ~ 1.6- 1.9, bina to 2.9 last month and was instructed to stop \par losartan - cr improved from 2.9 to 1.7; \par \par BP well controlled\par Lost weight - no night sweats, no fevers\par \par Continues to have poor fluid intake and urnary retention  \par \par Rec he f/u with Urology for urinary retention and that he increase his fluid intake, \par Should cont SPS to prevent recurrent hyperkalemia\par Will refrain from resuming ARB at this time even though he has proteinuria due to poor fluid intake and MARIEL\par \par F/u in 3 months \par

## 2020-09-04 NOTE — PHYSICAL EXAM
[General Appearance - Alert] : alert [General Appearance - Well Nourished] : well nourished [General Appearance - In No Acute Distress] : in no acute distress [Sclera] : the sclera and conjunctiva were normal [Extraocular Movements] : extraocular movements were intact [Outer Ear] : the ears and nose were normal in appearance [Hearing Threshold Finger Rub Not [place]] : hearing was normal [Neck Appearance] : the appearance of the neck was normal [Exaggerated Use Of Accessory Muscles For Inspiration] : no accessory muscle use [Apical Impulse] : the apical impulse was normal [Heart Sounds] : normal S1 and S2 [Arterial Pulses Carotid] : carotid pulses were normal with no bruits [Edema] : there was no peripheral edema [Veins - Varicosity Changes] : there were no varicosital changes [Bowel Sounds] : normal bowel sounds [Abdomen Soft] : soft [No Spinal Tenderness] : no spinal tenderness [No CVA Tenderness] : no ~M costovertebral angle tenderness [Skin Color & Pigmentation] : normal skin color and pigmentation [Abnormal Walk] : normal gait [Musculoskeletal - Swelling] : no joint swelling seen [] : no rash [No Focal Deficits] : no focal deficits [Cranial Nerves] : cranial nerves 2-12 were intact [Oriented To Time, Place, And Person] : oriented to person, place, and time [Affect] : the affect was normal

## 2020-09-04 NOTE — DISCUSSION/SUMMARY
[FreeTextEntry1] : spoke with pt, discussed lab results, potassium elevated, counseled on low K diet, and that I was prescribing HCTZ which has sulfa - informed hm that chart states he has an allergy to sulfa - he is not aware of this,does not believe he has such an allergy, will proceed with prescription and advised him and his daughter to watch for a rash, sob, diff breathing, chest pain and to come to er if it occurs

## 2020-09-14 ENCOUNTER — NON-APPOINTMENT (OUTPATIENT)
Age: 75
End: 2020-09-14

## 2020-09-14 ENCOUNTER — APPOINTMENT (OUTPATIENT)
Dept: CARDIOLOGY | Facility: CLINIC | Age: 75
End: 2020-09-14
Payer: MEDICARE

## 2020-09-14 VITALS
SYSTOLIC BLOOD PRESSURE: 140 MMHG | BODY MASS INDEX: 23.78 KG/M2 | HEIGHT: 66 IN | WEIGHT: 148 LBS | HEART RATE: 84 BPM | OXYGEN SATURATION: 96 % | DIASTOLIC BLOOD PRESSURE: 80 MMHG

## 2020-09-14 DIAGNOSIS — I48.20 CHRONIC ATRIAL FIBRILLATION, UNSP: ICD-10-CM

## 2020-09-14 DIAGNOSIS — I38 ENDOCARDITIS, VALVE UNSPECIFIED: ICD-10-CM

## 2020-09-14 DIAGNOSIS — Z01.89 ENCOUNTER FOR OTHER SPECIFIED SPECIAL EXAMINATIONS: ICD-10-CM

## 2020-09-14 DIAGNOSIS — E11.9 TYPE 2 DIABETES MELLITUS W/OUT COMPLICATIONS: ICD-10-CM

## 2020-09-14 DIAGNOSIS — I73.9 PERIPHERAL VASCULAR DISEASE, UNSPECIFIED: ICD-10-CM

## 2020-09-14 DIAGNOSIS — Z92.89 PERSONAL HISTORY OF OTHER MEDICAL TREATMENT: ICD-10-CM

## 2020-09-14 DIAGNOSIS — N18.9 CHRONIC KIDNEY DISEASE, UNSPECIFIED: ICD-10-CM

## 2020-09-14 DIAGNOSIS — I67.9 CEREBROVASCULAR DISEASE, UNSPECIFIED: ICD-10-CM

## 2020-09-14 DIAGNOSIS — Z79.01 LONG TERM (CURRENT) USE OF ANTICOAGULANTS: ICD-10-CM

## 2020-09-14 DIAGNOSIS — N52.8 OTHER MALE ERECTILE DYSFUNCTION: ICD-10-CM

## 2020-09-14 LAB — INR PPP: 1.9 RATIO

## 2020-09-14 PROCEDURE — 85610 PROTHROMBIN TIME: CPT | Mod: QW

## 2020-09-14 PROCEDURE — 93000 ELECTROCARDIOGRAM COMPLETE: CPT

## 2020-09-14 PROCEDURE — 99215 OFFICE O/P EST HI 40 MIN: CPT

## 2020-09-14 NOTE — ASSESSMENT
[FreeTextEntry1] : EKG 9/14/2020.  Atrial fibrillation, controlled.\par EKG 3/9/2020.  Atrial fibrillation, controlled.  No acute changes.\par EKG 9/6/2019.  Atrial fibrillation.  Controlled.  No acute changes.\par EKG 2/20/19. Atrial fibrillation. Controlled. Poor R wave progression. No acute changes.

## 2020-09-14 NOTE — DISCUSSION/SUMMARY
[FreeTextEntry1] : Brief recommendations and follow-up: (see above for details)\par \par The patient remains with a number of cardiovascular and medical issues but they appear well compensated.\par He has a number of consultations remaining.\par Blood pressure is improved.  Renal function is improved.\par Lipid profile is satisfactory.  He is statin intolerant.\par Continue anticoagulation follow-up, next check in 1 month.\par Next routine cardiology visit 6 months.

## 2020-09-14 NOTE — REASON FOR VISIT
[FreeTextEntry1] : Mr. TIAN TOBIAS has the following complex problem list.\par \par Atrial fibrillation\par Anticoagulation management\par Hypertension\par Type 2 diabetes\par Chronic kidney disease\par Peripheral vascular disease\par Echo/valve abnormalities\par Cerebrovascular disease, lacunar infarction.\par Dyslipidemia\par Erectile dysfunction\par \par He has additional medical problems as noted.\par \par His primary care physician is Doctor Leo.\par \par

## 2020-09-14 NOTE — HISTORY OF PRESENT ILLNESS
[FreeTextEntry1] : This 75 year-old male patient presents for cardiovascular evaluation.\par \par His problem list is as noted above.\par \par Since his last full office visit 6 months ago he has had a number of medical interactions.  He had evidence of progressive renal insufficiency.  He had consultation with Dr. Smith.  ARB was discontinued.  Creatinine improved now less than 2 back to baseline.\par \par He also had neurologic evaluation.  He did have evidence of a lacunar infarction and cerebrovascular disease but no acute abnormality.\par \par He is also had some weight loss and is being evaluated by his primary care physician and gastroenterologist.\par \par

## 2020-09-16 ENCOUNTER — MED ADMIN CHARGE (OUTPATIENT)
Age: 75
End: 2020-09-16

## 2020-10-12 ENCOUNTER — APPOINTMENT (OUTPATIENT)
Dept: CARDIOLOGY | Facility: CLINIC | Age: 75
End: 2020-10-12

## 2020-12-23 ENCOUNTER — APPOINTMENT (OUTPATIENT)
Dept: NEPHROLOGY | Facility: CLINIC | Age: 75
End: 2020-12-23
Payer: MEDICARE

## 2020-12-23 VITALS
HEART RATE: 88 BPM | DIASTOLIC BLOOD PRESSURE: 80 MMHG | HEIGHT: 66 IN | BODY MASS INDEX: 23.78 KG/M2 | OXYGEN SATURATION: 98 % | WEIGHT: 148 LBS | TEMPERATURE: 97.7 F | SYSTOLIC BLOOD PRESSURE: 160 MMHG

## 2020-12-23 PROCEDURE — 99214 OFFICE O/P EST MOD 30 MIN: CPT | Mod: 25

## 2020-12-23 PROCEDURE — 36415 COLL VENOUS BLD VENIPUNCTURE: CPT

## 2020-12-23 RX ORDER — SITAGLIPTIN AND METFORMIN HYDROCHLORIDE 50; 1000 MG/1; MG/1
50-1000 TABLET, FILM COATED, EXTENDED RELEASE ORAL
Refills: 0 | Status: DISCONTINUED | COMMUNITY
End: 2020-12-23

## 2020-12-23 RX ORDER — CHLORTHALIDONE 25 MG/1
25 TABLET ORAL DAILY
Qty: 45 | Refills: 3 | Status: DISCONTINUED | COMMUNITY
Start: 2020-08-04 | End: 2020-12-23

## 2020-12-23 NOTE — HISTORY OF PRESENT ILLNESS
[FreeTextEntry1] : 74 yo male here for f/u s/p josh CEA @ [place] 11/11 and 12/13;\par - who was initially referred by Dr. Leo and seen 7/2020 for eval of elevated creatinine; cr ~ 1.6- 1.9, bina to 2.9 - was instructed to stop losartan( probable FALGUNI)\par - cr improved from 2.9 to 1.7; \par \par BP well controlled\par Lost weight - no night sweats, no fevers\par \par improved fluid intake and urnary retention  \par \par \par children\par no etoh (occ glass of home made wine)\par no tobacco\par  no fhx of CKD, ESRD\par Cr ~ 1.5 3/2020, 1.7  9/2019\par \par US 6/2020 showed moderate prostatomegaly with intravesical protrusion and high pvr (94cc)\par no hydro no nephrolithiasis

## 2020-12-23 NOTE — ASSESSMENT
[FreeTextEntry1] : 76 yo male here for f/u s/p josh CEA @ [place] 11/11 and 12/13;\par - who was initially referred by Dr. Leo and seen 7/2020 for eval of elevated creatinine; cr ~ 1.6- 1.9, bina to 2.9 - was instructed to stop losartan( probable FALGUNI)\par - cr improved from 2.9 to 1.7; \par \par BP well controlled; currently on dilt 300, hydralazine, mtp 100\par \par Lost weight - no night sweats, no fevers- underwent thyroid US, will f/u Endocrine @ [place]\par \par US 6/2020 showed moderate prostatomegaly with intravesical protrusion and high pvr (94cc)\par no hydro no nephrolithiasis \par \par Rec he f/u with Urology for urinary retention and that he increase his fluid intake, \par Should cont SPS to prevent recurrent hyperkalemia\par Suspect FALGUNI ( burt given hx of josh carotids), will refrain from resuming ARB at this time even though he has proteinuria due to poor fluid intake and MARIEL\par Recheck BMET\par \par F/u in 3 months \par

## 2020-12-23 NOTE — PHYSICAL EXAM
[General Appearance - Alert] : alert [General Appearance - In No Acute Distress] : in no acute distress [General Appearance - Well Nourished] : well nourished [Sclera] : the sclera and conjunctiva were normal [Extraocular Movements] : extraocular movements were intact [Outer Ear] : the ears and nose were normal in appearance [Hearing Threshold Finger Rub Not [place]] : hearing was normal [Neck Appearance] : the appearance of the neck was normal [Exaggerated Use Of Accessory Muscles For Inspiration] : no accessory muscle use [Apical Impulse] : the apical impulse was normal [Heart Sounds] : normal S1 and S2 [Arterial Pulses Carotid] : carotid pulses were normal with no bruits [Edema] : there was no peripheral edema [Veins - Varicosity Changes] : there were no varicosital changes [Bowel Sounds] : normal bowel sounds [Abdomen Soft] : soft [No CVA Tenderness] : no ~M costovertebral angle tenderness [No Spinal Tenderness] : no spinal tenderness [Abnormal Walk] : normal gait [Musculoskeletal - Swelling] : no joint swelling seen [Skin Color & Pigmentation] : normal skin color and pigmentation [] : no rash [Cranial Nerves] : cranial nerves 2-12 were intact [No Focal Deficits] : no focal deficits [Oriented To Time, Place, And Person] : oriented to person, place, and time [Affect] : the affect was normal

## 2020-12-30 ENCOUNTER — RESULT REVIEW (OUTPATIENT)
Age: 75
End: 2020-12-30

## 2020-12-30 ENCOUNTER — APPOINTMENT (OUTPATIENT)
Dept: NEPHROLOGY | Facility: CLINIC | Age: 75
End: 2020-12-30
Payer: MEDICARE

## 2020-12-30 PROCEDURE — P9615: CPT

## 2020-12-30 PROCEDURE — 36415 COLL VENOUS BLD VENIPUNCTURE: CPT

## 2021-01-04 RX ORDER — APIXABAN 5 MG/1
5 TABLET, FILM COATED ORAL
Qty: 60 | Refills: 1 | Status: ACTIVE | COMMUNITY

## 2021-01-04 RX ORDER — WARFARIN 2.5 MG/1
2.5 TABLET ORAL
Qty: 60 | Refills: 3 | Status: DISCONTINUED | COMMUNITY
Start: 2019-09-09 | End: 2021-01-04

## 2021-03-23 ENCOUNTER — NON-APPOINTMENT (OUTPATIENT)
Age: 76
End: 2021-03-23

## 2021-03-23 NOTE — HISTORY OF PRESENT ILLNESS
[FreeTextEntry1] : This 75 year-old male patient presents for cardiovascular evaluation.\par \par His problem list is as noted above.\par \par THIS IS A CHART UPDATE PREPARED IN ADVANCE OF A FUTURE APPOINTMENT. THE ENTRY BELOW IS FROM THE PRIOR NOTE.\par \par \par

## 2021-03-23 NOTE — REVIEW OF SYSTEMS
[see HPI] : see HPI [Easy Bleeding] : no tendency for easy bleeding [Easy Bruising] : no tendency for easy bruising [Negative] : Integumentary

## 2021-03-23 NOTE — PHYSICAL EXAM
[Normal Conjunctiva] : the conjunctiva exhibited no abnormalities [Eyelids - No Xanthelasma] : the eyelids demonstrated no xanthelasmas [Normal Oral Mucosa] : normal oral mucosa [No Oral Pallor] : no oral pallor [No Oral Cyanosis] : no oral cyanosis [Normal Jugular Venous A Waves Present] : normal jugular venous A waves present [Normal Jugular Venous V Waves Present] : normal jugular venous V waves present [No Jugular Venous Randall A Waves] : no jugular venous randall A waves [Respiration, Rhythm And Depth] : normal respiratory rhythm and effort [Exaggerated Use Of Accessory Muscles For Inspiration] : no accessory muscle use [Auscultation Breath Sounds / Voice Sounds] : lungs were clear to auscultation bilaterally [FreeTextEntry1] : Normal S1S2. Faint 1/6 systolic murmur left sternal border. [Abdomen Soft] : soft [Abdomen Tenderness] : non-tender [Abdomen Mass (___ Cm)] : no abdominal mass palpated [Abnormal Walk] : normal gait [Gait - Sufficient For Exercise Testing] : the gait was sufficient for exercise testing [Nail Clubbing] : no clubbing of the fingernails [Cyanosis, Localized] : no localized cyanosis [Petechial Hemorrhages (___cm)] : no petechial hemorrhages [Skin Color & Pigmentation] : normal skin color and pigmentation [] : no rash [No Venous Stasis] : no venous stasis [Skin Lesions] : no skin lesions [No Skin Ulcers] : no skin ulcer [No Xanthoma] : no  xanthoma was observed [Oriented To Time, Place, And Person] : oriented to person, place, and time [Affect] : the affect was normal [Mood] : the mood was normal [No Anxiety] : not feeling anxious

## 2021-03-24 ENCOUNTER — APPOINTMENT (OUTPATIENT)
Dept: CARDIOLOGY | Facility: CLINIC | Age: 76
End: 2021-03-24

## 2021-03-25 ENCOUNTER — NON-APPOINTMENT (OUTPATIENT)
Age: 76
End: 2021-03-25

## 2021-04-21 ENCOUNTER — RESULT REVIEW (OUTPATIENT)
Age: 76
End: 2021-04-21

## 2021-04-21 ENCOUNTER — APPOINTMENT (OUTPATIENT)
Dept: NEPHROLOGY | Facility: CLINIC | Age: 76
End: 2021-04-21
Payer: MEDICARE

## 2021-04-21 VITALS
WEIGHT: 157 LBS | HEIGHT: 66 IN | BODY MASS INDEX: 25.23 KG/M2 | SYSTOLIC BLOOD PRESSURE: 130 MMHG | TEMPERATURE: 97.2 F | OXYGEN SATURATION: 97 % | DIASTOLIC BLOOD PRESSURE: 70 MMHG | HEART RATE: 85 BPM

## 2021-04-21 DIAGNOSIS — R33.9 RETENTION OF URINE, UNSPECIFIED: ICD-10-CM

## 2021-04-21 PROCEDURE — 36415 COLL VENOUS BLD VENIPUNCTURE: CPT

## 2021-04-21 PROCEDURE — 99214 OFFICE O/P EST MOD 30 MIN: CPT | Mod: 25

## 2021-04-21 RX ORDER — METOPROLOL SUCCINATE 50 MG/1
50 TABLET, EXTENDED RELEASE ORAL
Qty: 90 | Refills: 3 | Status: DISCONTINUED | COMMUNITY
Start: 2020-04-08 | End: 2021-04-21

## 2021-04-21 RX ORDER — DILTIAZEM HYDROCHLORIDE 120 MG/1
120 CAPSULE, EXTENDED RELEASE ORAL
Qty: 90 | Refills: 3 | Status: DISCONTINUED | COMMUNITY
End: 2021-04-21

## 2021-04-21 NOTE — PHYSICAL EXAM
[General Appearance - In No Acute Distress] : in no acute distress [General Appearance - Alert] : alert [General Appearance - Well Nourished] : well nourished [Sclera] : the sclera and conjunctiva were normal [Extraocular Movements] : extraocular movements were intact [Outer Ear] : the ears and nose were normal in appearance [Hearing Threshold Finger Rub Not [place]] : hearing was normal [Neck Appearance] : the appearance of the neck was normal [Exaggerated Use Of Accessory Muscles For Inspiration] : no accessory muscle use [Apical Impulse] : the apical impulse was normal [Heart Sounds] : normal S1 and S2 [Arterial Pulses Carotid] : carotid pulses were normal with no bruits [Edema] : there was no peripheral edema [Veins - Varicosity Changes] : there were no varicosital changes [Abdomen Soft] : soft [Bowel Sounds] : normal bowel sounds [No CVA Tenderness] : no ~M costovertebral angle tenderness [No Spinal Tenderness] : no spinal tenderness [Abnormal Walk] : normal gait [Musculoskeletal - Swelling] : no joint swelling seen [Skin Color & Pigmentation] : normal skin color and pigmentation [] : no rash [Cranial Nerves] : cranial nerves 2-12 were intact [No Focal Deficits] : no focal deficits [Oriented To Time, Place, And Person] : oriented to person, place, and time [Affect] : the affect was normal

## 2021-04-21 NOTE — HISTORY OF PRESENT ILLNESS
[FreeTextEntry1] : 77 yo male here for f/u s/p josh CEA @ [place] 11/11 and 12/13;\par - who was initially referred by Dr. Leo and seen 7/2020 for eval of elevated creatinine; cr ~ 1.6- 1.9, bina to 2.9 - was instructed to stop losartan( probable FALGUNI)\par - cr improved from 2.9 to 1.7; \par \par BP well controlled\par Lost weight - no night sweats, no fevers\par \par improved fluid intake and urinary retention  \par \par \par children\par no etoh (occ glass of home made wine)\par no tobacco\par  no fhx of CKD, ESRD\par Cr ~ 1.5 3/2020, 1.7  9/2019\par \par US 6/2020 showed moderate prostatomegaly with intravesical protrusion and high pvr (94cc)\par no hydro no nephrolithiasis

## 2021-04-21 NOTE — ASSESSMENT
[FreeTextEntry1] : 77 yo male here for f/u s/p josh CEA @ [facility] 11/11 and 12/13;\par - who was initially referred by Dr. Leo and seen 7/2020 for eval of elevated creatinine; cr ~ 1.6- 1.9, bina to 2.9 - was instructed to stop losartan( probable FALGUNI)\par - cr improved from 2.9 to 1.7; \par \par BP well controlled; currently on dilt 300\par \par Lost weight - no night sweats, no fevers- underwent thyroid US, will f/u Endocrine @ [facility]\par \par US 6/2020 showed moderate prostatomegaly with intravesical protrusion and high pvr (94cc)\par no hydro no nephrolithiasis \par \par Rec he f/u with Urology for urinary retention \par \par Suspect FALGUNI ( burt given hx of josh carotids), will refrain from resuming ARB at this time given hx of MARIEL\par Recheck BMET\par Recheck Upn/cr\par \par \par F/u in 3 months \par

## 2021-10-27 ENCOUNTER — APPOINTMENT (OUTPATIENT)
Dept: NEPHROLOGY | Facility: CLINIC | Age: 76
End: 2021-10-27
Payer: MEDICARE

## 2021-10-27 VITALS
HEART RATE: 86 BPM | BODY MASS INDEX: 26.84 KG/M2 | HEIGHT: 66 IN | TEMPERATURE: 96.8 F | WEIGHT: 167 LBS | SYSTOLIC BLOOD PRESSURE: 124 MMHG | OXYGEN SATURATION: 98 % | DIASTOLIC BLOOD PRESSURE: 80 MMHG

## 2021-10-27 DIAGNOSIS — J34.89 OTHER SPECIFIED DISORDERS OF NOSE AND NASAL SINUSES: ICD-10-CM

## 2021-10-27 PROCEDURE — 99214 OFFICE O/P EST MOD 30 MIN: CPT

## 2021-10-27 NOTE — HISTORY OF PRESENT ILLNESS
[FreeTextEntry1] : 77 yo male here for f/u s/p josh CEA @ [place] 11/11 and 12/13;\par - who was initially referred by Dr. Leo and seen 7/2020 for eval of elevated creatinine; cr ~ 1.6- 1.9, bina to 2.9 - was instructed to stop losartan( probable FALGUNI)\par - cr improved from 2.9 to 1.7 to 1.5 ( 9/2021)\par - Cr ~ 1.5 3/2020, 1.7  9/2019\par \par BP well controlled\par Lost weight - no night sweats, no fevers\par recently treated for 30 days with doxy 100mg bid for Lyme \par \par Improved fluid intake but has severe nocturia \par - advised he f/u with Urologist\par \par US 6/2020 showed moderate prostatomegaly with intravesical protrusion and high pvr (94cc)\par no hydro no nephrolithiasis

## 2021-10-27 NOTE — REASON FOR VISIT
[Initial Evaluation] : an initial evaluation [Family Member] : family member [FreeTextEntry1] : f/u for CKD , elevated cr, htn Universal Safety Interventions

## 2021-10-27 NOTE — PHYSICAL EXAM
[General Appearance - Alert] : alert [General Appearance - In No Acute Distress] : in no acute distress [General Appearance - Well Nourished] : well nourished [Sclera] : the sclera and conjunctiva were normal [Extraocular Movements] : extraocular movements were intact [Outer Ear] : the ears and nose were normal in appearance [Hearing Threshold Finger Rub Not [place]] : hearing was normal [Neck Appearance] : the appearance of the neck was normal [Exaggerated Use Of Accessory Muscles For Inspiration] : no accessory muscle use [Apical Impulse] : the apical impulse was normal [Heart Sounds] : normal S1 and S2 [Arterial Pulses Carotid] : carotid pulses were normal with no bruits [Edema] : there was no peripheral edema [Veins - Varicosity Changes] : there were no varicosital changes [Bowel Sounds] : normal bowel sounds [Abdomen Soft] : soft [No CVA Tenderness] : no ~M costovertebral angle tenderness [No Spinal Tenderness] : no spinal tenderness [Abnormal Walk] : normal gait [Musculoskeletal - Swelling] : no joint swelling seen [Skin Color & Pigmentation] : normal skin color and pigmentation [] : no rash [Cranial Nerves] : cranial nerves 2-12 were intact [No Focal Deficits] : no focal deficits [Oriented To Time, Place, And Person] : oriented to person, place, and time [Affect] : the affect was normal

## 2021-10-27 NOTE — ASSESSMENT
[FreeTextEntry1] : 77 yo male here for f/u s/p josh CEA @ [place] 11/11 and 12/13;\par - who was initially referred by Dr. Leo and seen 7/2020 for eval of elevated creatinine; cr ~ 1.6- 1.9, bina to 2.9 - was instructed to stop losartan( probable FALGUNI)\par - cr improved from 2.9 to 1.7; \par \par BP well controlled; currently on dilt 300\par \par Lost weight - no night sweats, no fevers- underwent thyroid US, will f/u Endocrine @ [place]\par \par US 6/2020 showed moderate prostatomegaly with intravesical protrusion and high pvr (94cc)\par no hydro no nephrolithiasis \par \par Rec he f/u with Urology for nocturia ( wakes hour) \par \par Suspect FALGUNI ( burt given hx of josh carotids), will refrain from resuming ARB at this time given hx of MARIEL\par \par C/o runny nose, scratchy throat - just completed 30 day course of abtx, suspect allergies, rx claritin\par \par \par F/u in 3 months \par

## 2022-04-11 PROBLEM — Z11.59 SCREENING FOR VIRAL DISEASE: Status: ACTIVE | Noted: 2020-07-30

## 2023-02-03 ENCOUNTER — RESULT REVIEW (OUTPATIENT)
Age: 78
End: 2023-02-03

## 2023-02-03 ENCOUNTER — APPOINTMENT (OUTPATIENT)
Dept: NEPHROLOGY | Facility: CLINIC | Age: 78
End: 2023-02-03
Payer: MEDICARE

## 2023-02-03 VITALS
HEIGHT: 66 IN | OXYGEN SATURATION: 97 % | SYSTOLIC BLOOD PRESSURE: 120 MMHG | TEMPERATURE: 98.1 F | DIASTOLIC BLOOD PRESSURE: 66 MMHG | BODY MASS INDEX: 28.61 KG/M2 | WEIGHT: 178 LBS | HEART RATE: 76 BPM

## 2023-02-03 DIAGNOSIS — E78.5 HYPERLIPIDEMIA, UNSPECIFIED: ICD-10-CM

## 2023-02-03 PROCEDURE — 99214 OFFICE O/P EST MOD 30 MIN: CPT

## 2023-02-03 RX ORDER — TESTOSTERONE 12.5 MG/1
12.5 MG/ACT GEL, METERED TOPICAL
Refills: 0 | Status: DISCONTINUED | COMMUNITY
End: 2023-02-03

## 2023-02-03 RX ORDER — SODIUM POLYSTYRENE SULFONATE 4.1 MEQ/G
POWDER, FOR SUSPENSION ORAL; RECTAL
Qty: 450 | Refills: 6 | Status: DISCONTINUED | COMMUNITY
Start: 2020-07-30 | End: 2023-02-03

## 2023-02-03 NOTE — PHYSICAL EXAM
[General Appearance - Alert] : alert [General Appearance - In No Acute Distress] : in no acute distress [General Appearance - Well Nourished] : well nourished [Sclera] : the sclera and conjunctiva were normal [Extraocular Movements] : extraocular movements were intact [Outer Ear] : the ears and nose were normal in appearance [Hearing Threshold Finger Rub Not [place]] : hearing was normal [Neck Appearance] : the appearance of the neck was normal [Exaggerated Use Of Accessory Muscles For Inspiration] : no accessory muscle use [Apical Impulse] : the apical impulse was normal [Heart Sounds] : normal S1 and S2 [Arterial Pulses Carotid] : carotid pulses were normal with no bruits [Edema] : there was no peripheral edema [Veins - Varicosity Changes] : there were no varicosital changes [Bowel Sounds] : normal bowel sounds [Abdomen Soft] : soft [No CVA Tenderness] : no ~M costovertebral angle tenderness [No Spinal Tenderness] : no spinal tenderness [Abnormal Walk] : normal gait [Musculoskeletal - Swelling] : no joint swelling seen [Skin Color & Pigmentation] : normal skin color and pigmentation [] : no rash [Cranial Nerves] : cranial nerves 2-12 were intact [No Focal Deficits] : no focal deficits [Oriented To Time, Place, And Person] : oriented to person, place, and time [Affect] : the affect was normal

## 2023-02-08 NOTE — HISTORY OF PRESENT ILLNESS
[FreeTextEntry1] : 76 yo male here for f/u s/p josh CEA @ [place] 11/11 and 12/13/20;\par Here for f/u\par - initially referred by Dr. Leo and seen 7/2020 for eval of elevated creatinine; cr ~ 1.6- 1.9, bina to 2.9 - was instructed to stop losartan( probable FALGUNI)\par - after stopping losartan cr improved from 2.9 to 1.7 to 1.5 ( 9/2021)\par - Cr ~ 1.5 3/2020, 1.7  9/2019 --> 11/2022 1.9\par - recent  episode of gout\par \par BP well controlled\par Lost weight - no night sweats, no fevers\par \par Previously treated for 30 days with doxy 100mg bid for Lyme \par \par Improved fluid intake but has severe nocturia \par -f/u with Urologist, reportedly normal prostate\par - US 6/2020 showed moderate prostatomegaly with intravesical protrusion and high pvr (94cc)\par no hydro no nephrolithiasis

## 2023-02-08 NOTE — REASON FOR VISIT
[Family Member] : family member [Follow-Up] : a follow-up visit [FreeTextEntry1] : f/u for CKD , elevated cr, htn

## 2023-02-08 NOTE — ASSESSMENT
[FreeTextEntry1] : 78 yo male here for f/u s/p josh CEA @ [place] 11/11 and 12/13;\par - was initially referred by Dr. Leo and seen 7/2020 for eval of elevated creatinine; cr ~ 1.6- 1.9, bina to 2.9 - was instructed to stop losartan( probable FALGUNI)\par - cr improved from 2.9 to 1.7; 1.9 in 11/2022\par \par BP well controlled; currently on dilt 300\par \par Lost weight - no night sweats, no fevers- underwent thyroid US, will f/u Endocrine @ [facility]\par \par US 6/2020 showed moderate prostatomegaly with intravesical protrusion and high pvr (94cc)\par no hydro no nephrolithiasis  - followed by Urology\par \par Suspect FALGUNI ( burt given hx of josh carotids), will refrain from resuming ARB at this time given hx of MARIEL\par \par BMET, cr, K, co2, cbc reviewed\par \par Check BMET, uric acid, UPC\par dep on results may start allopurinol and jardiance\par \par F/u in 3 months, sooner dep on labs\par

## 2023-04-18 ENCOUNTER — OFFICE (OUTPATIENT)
Dept: URBAN - METROPOLITAN AREA CLINIC 29 | Facility: CLINIC | Age: 78
Setting detail: OPHTHALMOLOGY
End: 2023-04-18
Payer: MEDICARE

## 2023-04-18 DIAGNOSIS — E11.9: ICD-10-CM

## 2023-04-18 DIAGNOSIS — H25.13: ICD-10-CM

## 2023-04-18 DIAGNOSIS — E11.3293: ICD-10-CM

## 2023-04-18 DIAGNOSIS — H35.372: ICD-10-CM

## 2023-04-18 DIAGNOSIS — H43.393: ICD-10-CM

## 2023-04-18 DIAGNOSIS — H16.223: ICD-10-CM

## 2023-04-18 DIAGNOSIS — H35.3131: ICD-10-CM

## 2023-04-18 DIAGNOSIS — H11.153: ICD-10-CM

## 2023-04-18 DIAGNOSIS — H43.812: ICD-10-CM

## 2023-04-18 PROCEDURE — 92202 OPSCPY EXTND ON/MAC DRAW: CPT | Performed by: OPHTHALMOLOGY

## 2023-04-18 PROCEDURE — 99214 OFFICE O/P EST MOD 30 MIN: CPT | Performed by: OPHTHALMOLOGY

## 2023-04-18 ASSESSMENT — REFRACTION_MANIFEST
OD_CYLINDER: SPHERE
OS_SPHERE: -0.25
OD_VA2: 20/30(J2)
OD_ADD: +2.50
OD_VA1: 20/30
OS_CYLINDER: SPHERE
OD_SPHERE: PLANO
OS_VA2: 20/30(J2)
OD_VA2: 20/30(J2)
OD_ADD: +2.25
OS_VA1: 20/30
OS_CYLINDER: SPHERE
OS_VA2: 20/30(J2)
OS_VA1: 20/40-1
OS_SPHERE: +0.50
OD_VA1: 20/40-2
OS_ADD: +2.25
OD_CYLINDER: SPHERE
OD_SPHERE: +0.50
OS_ADD: +2.50

## 2023-04-18 ASSESSMENT — SPHEQUIV_DERIVED: OD_SPHEQUIV: 0.125

## 2023-04-18 ASSESSMENT — TONOMETRY
OD_IOP_MMHG: 18
OS_IOP_MMHG: 14

## 2023-04-18 ASSESSMENT — REFRACTION_CURRENTRX
OD_SPHERE: +2.50
OS_SPHERE: +2.50
OS_CYLINDER: SPH
OS_OVR_VA: 20/
OD_CYLINDER: SPH
OD_OVR_VA: 20/

## 2023-04-18 ASSESSMENT — REFRACTION_AUTOREFRACTION
OS_CYLINDER: SPHERE
OD_CYLINDER: +0.75
OS_SPHERE: -0.75
OD_AXIS: 014
OD_SPHERE: -0.25

## 2023-04-18 ASSESSMENT — CONFRONTATIONAL VISUAL FIELD TEST (CVF)
OS_FINDINGS: FULL
OD_FINDINGS: FULL

## 2023-04-18 ASSESSMENT — VISUAL ACUITY
OS_BCVA: 20/40-2
OD_BCVA: 20/50-2

## 2023-04-18 ASSESSMENT — LID EXAM ASSESSMENTS
OS_BLEPHARITIS: T
OD_BLEPHARITIS: T

## 2023-04-18 ASSESSMENT — TEAR BREAK UP TIME (TBUT)
OD_TBUT: T
OS_TBUT: T

## 2023-11-29 ENCOUNTER — APPOINTMENT (OUTPATIENT)
Dept: NEPHROLOGY | Facility: CLINIC | Age: 78
End: 2023-11-29
Payer: MEDICARE

## 2023-11-29 ENCOUNTER — RESULT REVIEW (OUTPATIENT)
Age: 78
End: 2023-11-29

## 2023-11-29 VITALS
OXYGEN SATURATION: 96 % | HEIGHT: 66 IN | WEIGHT: 175 LBS | SYSTOLIC BLOOD PRESSURE: 126 MMHG | HEART RATE: 79 BPM | BODY MASS INDEX: 28.12 KG/M2 | DIASTOLIC BLOOD PRESSURE: 80 MMHG

## 2023-11-29 DIAGNOSIS — N40.1 BENIGN PROSTATIC HYPERPLASIA WITH LOWER URINARY TRACT SYMPMS: ICD-10-CM

## 2023-11-29 DIAGNOSIS — N13.8 BENIGN PROSTATIC HYPERPLASIA WITH LOWER URINARY TRACT SYMPMS: ICD-10-CM

## 2023-11-29 PROCEDURE — 99214 OFFICE O/P EST MOD 30 MIN: CPT

## 2023-11-29 RX ORDER — LINACLOTIDE 145 UG/1
145 CAPSULE, GELATIN COATED ORAL DAILY
Refills: 0 | Status: ACTIVE | COMMUNITY

## 2023-11-29 RX ORDER — GLIMEPIRIDE 1 MG/1
1 TABLET ORAL DAILY
Refills: 0 | Status: ACTIVE | COMMUNITY

## 2023-11-29 RX ORDER — CHLORHEXIDINE GLUCONATE 4 %
325 (65 FE) LIQUID (ML) TOPICAL DAILY
Refills: 0 | Status: ACTIVE | COMMUNITY

## 2023-11-29 RX ORDER — FAMOTIDINE 40 MG/1
40 TABLET, FILM COATED ORAL DAILY
Refills: 0 | Status: ACTIVE | COMMUNITY

## 2023-11-29 RX ORDER — SITAGLIPTIN 50 MG/1
50 TABLET, FILM COATED ORAL DAILY
Refills: 0 | Status: ACTIVE | COMMUNITY

## 2023-11-29 RX ORDER — DILTIAZEM HCL 300 MG
300 CAPSULE, EXT RELEASE 24 HR ORAL DAILY
Refills: 0 | Status: ACTIVE | COMMUNITY

## 2023-11-29 RX ORDER — PREGABALIN 25 MG/1
25 CAPSULE ORAL
Refills: 0 | Status: ACTIVE | COMMUNITY

## 2023-11-29 RX ORDER — ROSUVASTATIN CALCIUM 10 MG/1
10 TABLET, FILM COATED ORAL
Refills: 0 | Status: ACTIVE | COMMUNITY

## 2023-12-20 DIAGNOSIS — R80.8 OTHER PROTEINURIA: ICD-10-CM

## 2023-12-20 RX ORDER — LOSARTAN POTASSIUM 25 MG/1
25 TABLET, FILM COATED ORAL
Qty: 45 | Refills: 0 | Status: ACTIVE | COMMUNITY
Start: 2023-12-20 | End: 1900-01-01

## 2024-01-08 ENCOUNTER — APPOINTMENT (OUTPATIENT)
Dept: NEPHROLOGY | Facility: CLINIC | Age: 79
End: 2024-01-08

## 2024-02-28 ENCOUNTER — APPOINTMENT (OUTPATIENT)
Dept: NEPHROLOGY | Facility: CLINIC | Age: 79
End: 2024-02-28

## 2024-03-19 NOTE — REVIEW OF SYSTEMS
[id number], Neck Injury-A-OH     [Fever] : no fever [Headache] : no headache [Recent Weight Gain (___ Lbs)] : no recent weight gain [Chills] : no chills [Feeling Fatigued] : not feeling fatigued [Recent Weight Loss (___ Lbs)] : no recent weight loss [Shortness Of Breath] : no shortness of breath [Dyspnea on exertion] : not dyspnea during exertion [Chest Pain] : no chest pain [Chest  Pressure] : no chest pressure [Lower Ext Edema] : no extremity edema [Cough] : no cough [Palpitations] : no palpitations [Skin: A Rash] : no rash: [Muscle Cramps] : no muscle cramps [Anxiety] : no anxiety [Easy Bleeding] : a tendency for easy bleeding [Easy Bruising] : a tendency for easy bruising

## 2024-04-17 ENCOUNTER — APPOINTMENT (OUTPATIENT)
Dept: NEPHROLOGY | Facility: CLINIC | Age: 79
End: 2024-04-17
Payer: MEDICARE

## 2024-04-17 VITALS
HEIGHT: 61 IN | WEIGHT: 174 LBS | SYSTOLIC BLOOD PRESSURE: 170 MMHG | OXYGEN SATURATION: 97 % | BODY MASS INDEX: 32.85 KG/M2 | HEART RATE: 83 BPM | DIASTOLIC BLOOD PRESSURE: 82 MMHG

## 2024-04-17 DIAGNOSIS — E87.5 HYPERKALEMIA: ICD-10-CM

## 2024-04-17 DIAGNOSIS — E11.22 TYPE 2 DIABETES MELLITUS WITH DIABETIC CHRONIC KIDNEY DISEASE: ICD-10-CM

## 2024-04-17 DIAGNOSIS — I10 ESSENTIAL (PRIMARY) HYPERTENSION: ICD-10-CM

## 2024-04-17 DIAGNOSIS — N18.30 TYPE 2 DIABETES MELLITUS WITH DIABETIC CHRONIC KIDNEY DISEASE: ICD-10-CM

## 2024-04-17 PROCEDURE — 99214 OFFICE O/P EST MOD 30 MIN: CPT

## 2024-04-17 NOTE — PHYSICAL EXAM
[General Appearance - Alert] : alert [General Appearance - In No Acute Distress] : in no acute distress [General Appearance - Well Nourished] : well nourished [Sclera] : the sclera and conjunctiva were normal [Extraocular Movements] : extraocular movements were intact [Outer Ear] : the ears and nose were normal in appearance [Hearing Threshold Finger Rub Not [place]] : hearing was normal [Neck Appearance] : the appearance of the neck was normal [Exaggerated Use Of Accessory Muscles For Inspiration] : no accessory muscle use [Apical Impulse] : the apical impulse was normal [Heart Sounds] : normal S1 and S2 [Arterial Pulses Carotid] : carotid pulses were normal with no bruits [Edema] : there was no peripheral edema [Veins - Varicosity Changes] : there were no varicosital changes [Bowel Sounds] : normal bowel sounds [Abdomen Soft] : soft [No CVA Tenderness] : no ~M costovertebral angle tenderness [No Spinal Tenderness] : no spinal tenderness [Abnormal Walk] : normal gait [Musculoskeletal - Swelling] : no joint swelling seen [Skin Color & Pigmentation] : normal skin color and pigmentation [] : no rash [Cranial Nerves] : cranial nerves 2-12 were intact [No Focal Deficits] : no focal deficits [Oriented To Time, Place, And Person] : oriented to person, place, and time [Affect] : the affect was normal

## 2024-04-17 NOTE — ASSESSMENT
[FreeTextEntry1] : 79 yo male here for f/u s/p josh CEA @ [place] 11/11 and 12/13;  CKD 3 - was initially referred by Dr. Leo and seen 7/2020 for eval of elevated creatinine; cr ~ 1.6- 1.9, bina to 2.9 - was instructed to stop losartan( probable FALGUNI),cr improved from 2.9 to 1.7; 1.9 in 11/2022--> 1.8 2/2023  HTN - BP elevated; currently on dilt 300, losartan 12.5mg - BP at /87 4/4/24 Suspect FALGUNI ( burt given hx of josh carotids), previously on ARB, given hx of MARIEL and hyperkalemia, it was discontinued and resumed at 12.5mg MWF - rpeat BMEt showed cr back to baseline of 1.6, but it wa sincreased by PCP, need repeat BMET  BPH -US 6/2020 showed moderate prostatomegaly with intravesical protrusion and high pvr (94cc) no hydro no nephrolithiasis - followed by Urology    BMET, cr, K, co2, cbc reviewed  Check BMET, uric acid, UPC dep on results may start allopurinol and jardiance or additional BP meds

## 2024-04-17 NOTE — REASON FOR VISIT
[Follow-Up] : a follow-up visit [Family Member] : family member [FreeTextEntry1] : f/u for CKD , elevated cr, htn

## 2024-04-17 NOTE — HISTORY OF PRESENT ILLNESS
[FreeTextEntry1] : 79 yo male here for f/u s/p josh CEA @ [place] 11/11 and 12/13/20; Here for f/u - initially referred by Dr. Leo and seen 7/2020 for eval of elevated creatinine; cr ~ 1.6- 1.9, bina to 2.9 - was instructed to stop losartan( probable FALGUNI) and resumed low dose 12.5mwf - cr has since improved to 1.6, but BP has been elevated, so PCP increased to 25mg daily - BP still elevated -   - after stopping losartan cr improved from 2.9 to 1.7 to 1.5 ( 9/2021), after resuming low dose cr remained stable at 1.6mg - Cr ~ 1.5 3/2020, 1.7  9/2019 --> 11/2022 1.9 - past episode of gout  HTN - BP elevated  BPH - f/u with Urologist, reportedly normal prostate - US 6/2020 showed moderate prostatomegaly with intravesical protrusion and high pvr (94cc) no hydro no nephrolithiasis

## 2024-04-18 LAB
ANION GAP SERPL CALC-SCNC: 16 MMOL/L
BUN SERPL-MCNC: 33 MG/DL
CALCIUM SERPL-MCNC: 10.3 MG/DL
CHLORIDE SERPL-SCNC: 102 MMOL/L
CO2 SERPL-SCNC: 24 MMOL/L
CREAT SERPL-MCNC: 1.47 MG/DL
CREAT SPEC-SCNC: 67 MG/DL
CREAT/PROT UR: 0.8 RATIO
EGFR: 48 ML/MIN/1.73M2
GLUCOSE SERPL-MCNC: 111 MG/DL
OSMOLALITY SERPL: 305 MOSMOL/KG
POTASSIUM SERPL-SCNC: 4.7 MMOL/L
PROT UR-MCNC: 56 MG/DL
SODIUM SERPL-SCNC: 142 MMOL/L
URATE SERPL-MCNC: 7 MG/DL

## 2024-08-29 ENCOUNTER — NON-APPOINTMENT (OUTPATIENT)
Age: 79
End: 2024-08-29

## 2024-08-29 DIAGNOSIS — N18.30 TYPE 2 DIABETES MELLITUS WITH DIABETIC CHRONIC KIDNEY DISEASE: ICD-10-CM

## 2024-08-29 DIAGNOSIS — R46.89 OTHER SYMPTOMS AND SIGNS INVOLVING APPEARANCE AND BEHAVIOR: ICD-10-CM

## 2024-08-29 DIAGNOSIS — Z92.89 PERSONAL HISTORY OF OTHER MEDICAL TREATMENT: ICD-10-CM

## 2024-08-29 DIAGNOSIS — M48.062 SPINAL STENOSIS, LUMBAR REGION WITH NEUROGENIC CLAUDICATION: ICD-10-CM

## 2024-08-29 DIAGNOSIS — Z87.898 PERSONAL HISTORY OF OTHER SPECIFIED CONDITIONS: ICD-10-CM

## 2024-08-29 DIAGNOSIS — Z87.09 PERSONAL HISTORY OF OTHER DISEASES OF THE RESPIRATORY SYSTEM: ICD-10-CM

## 2024-08-29 DIAGNOSIS — E11.22 TYPE 2 DIABETES MELLITUS WITH DIABETIC CHRONIC KIDNEY DISEASE: ICD-10-CM

## 2024-08-29 DIAGNOSIS — N52.8 OTHER MALE ERECTILE DYSFUNCTION: ICD-10-CM

## 2024-08-29 DIAGNOSIS — E78.5 HYPERLIPIDEMIA, UNSPECIFIED: ICD-10-CM

## 2024-08-29 DIAGNOSIS — Z01.89 ENCOUNTER FOR OTHER SPECIFIED SPECIAL EXAMINATIONS: ICD-10-CM

## 2024-08-29 DIAGNOSIS — N17.9 ACUTE KIDNEY FAILURE, UNSPECIFIED: ICD-10-CM

## 2024-08-29 DIAGNOSIS — N40.1 BENIGN PROSTATIC HYPERPLASIA WITH LOWER URINARY TRACT SYMPMS: ICD-10-CM

## 2024-08-29 DIAGNOSIS — M48.061 SPINAL STENOSIS, LUMBAR REGION WITHOUT NEUROGENIC CLAUDICATION: ICD-10-CM

## 2024-08-29 DIAGNOSIS — Z11.59 ENCOUNTER FOR SCREENING FOR OTHER VIRAL DISEASES: ICD-10-CM

## 2024-08-29 DIAGNOSIS — R80.8 OTHER PROTEINURIA: ICD-10-CM

## 2024-08-29 DIAGNOSIS — N13.8 BENIGN PROSTATIC HYPERPLASIA WITH LOWER URINARY TRACT SYMPMS: ICD-10-CM

## 2024-08-29 NOTE — PHYSICAL EXAM

## 2024-08-29 NOTE — HISTORY OF PRESENT ILLNESS
[FreeTextEntry1] : Mr Roberts Is a new patient to me today previously followed by Dr. Kitchen for atrial fibrillation.

## 2024-08-29 NOTE — CARDIOLOGY SUMMARY
[de-identified] : 8/30/24-[facility] [de-identified] : 2019- ef 65%, lvh rhett mild-mod TR, aortic sclerosis

## 2024-08-30 ENCOUNTER — APPOINTMENT (OUTPATIENT)
Dept: CARDIOLOGY | Facility: CLINIC | Age: 79
End: 2024-08-30

## 2024-09-06 ENCOUNTER — OFFICE (OUTPATIENT)
Dept: URBAN - METROPOLITAN AREA CLINIC 29 | Facility: CLINIC | Age: 79
Setting detail: OPHTHALMOLOGY
End: 2024-09-06
Payer: MEDICARE

## 2024-09-06 DIAGNOSIS — E11.3293: ICD-10-CM

## 2024-09-06 DIAGNOSIS — H16.223: ICD-10-CM

## 2024-09-06 DIAGNOSIS — H25.11: ICD-10-CM

## 2024-09-06 DIAGNOSIS — E11.9: ICD-10-CM

## 2024-09-06 DIAGNOSIS — H35.3131: ICD-10-CM

## 2024-09-06 DIAGNOSIS — H18.523: ICD-10-CM

## 2024-09-06 DIAGNOSIS — H25.13: ICD-10-CM

## 2024-09-06 PROBLEM — H40.033 NARROW ANGLE; BOTH EYES: Status: ACTIVE | Noted: 2024-09-06

## 2024-09-06 PROBLEM — H25.12 CATARACT SENILE NUCLEAR SCLEROSIS; RIGHT EYE, LEFT EYE: Status: ACTIVE | Noted: 2024-09-06

## 2024-09-06 PROBLEM — H21.563 PUPILLARY ABNORMALITIES; BOTH EYES: Status: ACTIVE | Noted: 2024-09-06

## 2024-09-06 PROCEDURE — 92136 OPHTHALMIC BIOMETRY: CPT | Mod: TC | Performed by: OPHTHALMOLOGY

## 2024-09-06 PROCEDURE — 92134 CPTRZ OPH DX IMG PST SGM RTA: CPT | Performed by: OPHTHALMOLOGY

## 2024-09-06 PROCEDURE — 92025 CPTRIZED CORNEAL TOPOGRAPHY: CPT | Performed by: OPHTHALMOLOGY

## 2024-09-06 PROCEDURE — 99214 OFFICE O/P EST MOD 30 MIN: CPT | Performed by: OPHTHALMOLOGY

## 2024-09-06 PROCEDURE — 92136 OPHTHALMIC BIOMETRY: CPT | Mod: 26,RT | Performed by: OPHTHALMOLOGY

## 2024-09-06 ASSESSMENT — LID EXAM ASSESSMENTS
OD_BLEPHARITIS: T
OS_BLEPHARITIS: T

## 2024-09-06 ASSESSMENT — CONFRONTATIONAL VISUAL FIELD TEST (CVF)
OD_FINDINGS: FULL
OS_FINDINGS: FULL

## 2024-10-24 ENCOUNTER — AMBULATORY SURGERY CENTER (OUTPATIENT)
Dept: URBAN - METROPOLITAN AREA SURGERY 17 | Facility: SURGERY | Age: 79
Setting detail: OPHTHALMOLOGY
End: 2024-10-24
Payer: MEDICARE

## 2024-10-24 DIAGNOSIS — H25.11: ICD-10-CM

## 2024-10-24 PROCEDURE — 66984 XCAPSL CTRC RMVL W/O ECP: CPT | Mod: RT | Performed by: OPHTHALMOLOGY

## 2024-10-25 ENCOUNTER — OFFICE (OUTPATIENT)
Dept: URBAN - METROPOLITAN AREA CLINIC 29 | Facility: CLINIC | Age: 79
Setting detail: OPHTHALMOLOGY
End: 2024-10-25
Payer: MEDICARE

## 2024-10-25 ENCOUNTER — RX ONLY (RX ONLY)
Age: 79
End: 2024-10-25

## 2024-10-25 DIAGNOSIS — H25.12: ICD-10-CM

## 2024-10-25 DIAGNOSIS — H16.223: ICD-10-CM

## 2024-10-25 DIAGNOSIS — E11.3293: ICD-10-CM

## 2024-10-25 DIAGNOSIS — H40.033: ICD-10-CM

## 2024-10-25 DIAGNOSIS — H43.393: ICD-10-CM

## 2024-10-25 DIAGNOSIS — H11.153: ICD-10-CM

## 2024-10-25 DIAGNOSIS — H43.812: ICD-10-CM

## 2024-10-25 DIAGNOSIS — H35.372: ICD-10-CM

## 2024-10-25 DIAGNOSIS — H18.523: ICD-10-CM

## 2024-10-25 DIAGNOSIS — Z96.1: ICD-10-CM

## 2024-10-25 DIAGNOSIS — E11.9: ICD-10-CM

## 2024-10-25 DIAGNOSIS — H35.3131: ICD-10-CM

## 2024-10-25 PROCEDURE — 99024 POSTOP FOLLOW-UP VISIT: CPT | Performed by: OPHTHALMOLOGY

## 2024-10-25 ASSESSMENT — REFRACTION_AUTOREFRACTION
OS_AXIS: 176
OD_SPHERE: -1.00
OS_CYLINDER: +0.50
OS_SPHERE: -1.75
OD_AXIS: 4
OD_CYLINDER: +0.75

## 2024-10-25 ASSESSMENT — REFRACTION_CURRENTRX
OS_SPHERE: +2.50
OD_CYLINDER: SPH
OS_CYLINDER: SPH
OD_OVR_VA: 20/
OS_OVR_VA: 20/
OD_SPHERE: +2.50

## 2024-10-25 ASSESSMENT — REFRACTION_MANIFEST
OS_VA1: 20/40-1
OS_ADD: +2.50
OS_CYLINDER: SPHERE
OD_VA1: 20/30
OS_VA1: 20/30
OD_CYLINDER: SPHERE
OS_SPHERE: +0.50
OD_VA1: 20/40-2
OD_ADD: +2.50
OS_VA2: 20/30(J2)
OD_ADD: +2.25
OS_VA2: 20/30(J2)
OS_ADD: +2.25
OD_VA2: 20/30(J2)
OD_VA2: 20/30(J2)
OS_SPHERE: -0.25
OD_CYLINDER: SPHERE
OS_CYLINDER: SPHERE
OD_SPHERE: PLANO
OD_SPHERE: +0.50

## 2024-10-25 ASSESSMENT — TEAR BREAK UP TIME (TBUT)
OS_TBUT: T
OD_TBUT: T

## 2024-10-25 ASSESSMENT — LID EXAM ASSESSMENTS
OD_BLEPHARITIS: T
OS_BLEPHARITIS: T

## 2024-10-25 ASSESSMENT — CORNEAL EDEMA - FOLDS/STRIAE: OD_FOLDSSTRIAE: T

## 2024-10-25 ASSESSMENT — CONFRONTATIONAL VISUAL FIELD TEST (CVF)
OS_FINDINGS: FULL
OD_FINDINGS: FULL

## 2024-10-25 ASSESSMENT — VISUAL ACUITY
OS_BCVA: 20/60-1
OD_BCVA: 20/80+2

## 2024-10-30 ENCOUNTER — APPOINTMENT (OUTPATIENT)
Dept: NEPHROLOGY | Facility: CLINIC | Age: 79
End: 2024-10-30
Payer: MEDICARE

## 2024-10-30 VITALS
SYSTOLIC BLOOD PRESSURE: 100 MMHG | DIASTOLIC BLOOD PRESSURE: 60 MMHG | WEIGHT: 174 LBS | HEIGHT: 61 IN | HEART RATE: 72 BPM | BODY MASS INDEX: 32.85 KG/M2 | OXYGEN SATURATION: 98 %

## 2024-10-30 PROCEDURE — 99214 OFFICE O/P EST MOD 30 MIN: CPT

## 2024-10-30 PROCEDURE — G2211 COMPLEX E/M VISIT ADD ON: CPT

## 2024-10-31 LAB
ANION GAP SERPL CALC-SCNC: 14 MMOL/L
BUN SERPL-MCNC: 51 MG/DL
CALCIUM SERPL-MCNC: 10.1 MG/DL
CHLORIDE SERPL-SCNC: 101 MMOL/L
CO2 SERPL-SCNC: 25 MMOL/L
CREAT SERPL-MCNC: 1.82 MG/DL
EGFR: 37 ML/MIN/1.73M2
GLUCOSE SERPL-MCNC: 196 MG/DL
MAGNESIUM SERPL-MCNC: 1.8 MG/DL
OSMOLALITY SERPL: 320 MOSMOL/KG
POTASSIUM SERPL-SCNC: 5.6 MMOL/L
SODIUM SERPL-SCNC: 141 MMOL/L
URATE SERPL-MCNC: 8 MG/DL

## 2024-11-04 DIAGNOSIS — E78.5 HYPERLIPIDEMIA, UNSPECIFIED: ICD-10-CM

## 2024-11-04 DIAGNOSIS — R80.8 OTHER PROTEINURIA: ICD-10-CM

## 2024-11-05 ENCOUNTER — APPOINTMENT (OUTPATIENT)
Dept: NEPHROLOGY | Facility: CLINIC | Age: 79
End: 2024-11-05
Payer: MEDICARE

## 2024-11-05 DIAGNOSIS — I10 ESSENTIAL (PRIMARY) HYPERTENSION: ICD-10-CM

## 2024-11-05 DIAGNOSIS — N40.1 BENIGN PROSTATIC HYPERPLASIA WITH LOWER URINARY TRACT SYMPMS: ICD-10-CM

## 2024-11-05 DIAGNOSIS — N18.30 TYPE 2 DIABETES MELLITUS WITH DIABETIC CHRONIC KIDNEY DISEASE: ICD-10-CM

## 2024-11-05 DIAGNOSIS — E11.22 TYPE 2 DIABETES MELLITUS WITH DIABETIC CHRONIC KIDNEY DISEASE: ICD-10-CM

## 2024-11-05 DIAGNOSIS — N13.8 BENIGN PROSTATIC HYPERPLASIA WITH LOWER URINARY TRACT SYMPMS: ICD-10-CM

## 2024-11-05 DIAGNOSIS — E87.5 HYPERKALEMIA: ICD-10-CM

## 2024-11-05 PROCEDURE — 36415 COLL VENOUS BLD VENIPUNCTURE: CPT

## 2024-11-06 LAB
ANION GAP SERPL CALC-SCNC: 12 MMOL/L
BUN SERPL-MCNC: 50 MG/DL
CALCIUM SERPL-MCNC: 9.8 MG/DL
CHLORIDE SERPL-SCNC: 107 MMOL/L
CO2 SERPL-SCNC: 24 MMOL/L
CREAT SERPL-MCNC: 2.25 MG/DL
EGFR: 29 ML/MIN/1.73M2
GLUCOSE SERPL-MCNC: 79 MG/DL
MAGNESIUM SERPL-MCNC: 1.8 MG/DL
OSMOLALITY SERPL: 305 MOSMOL/KG
POTASSIUM SERPL-SCNC: 5.1 MMOL/L
SODIUM SERPL-SCNC: 142 MMOL/L
URATE SERPL-MCNC: 7.9 MG/DL

## 2024-11-18 DIAGNOSIS — E11.9 TYPE 2 DIABETES MELLITUS W/OUT COMPLICATIONS: ICD-10-CM

## 2024-11-19 ENCOUNTER — APPOINTMENT (OUTPATIENT)
Dept: NEPHROLOGY | Facility: CLINIC | Age: 79
End: 2024-11-19
Payer: MEDICARE

## 2024-11-19 DIAGNOSIS — N18.30 TYPE 2 DIABETES MELLITUS WITH DIABETIC CHRONIC KIDNEY DISEASE: ICD-10-CM

## 2024-11-19 DIAGNOSIS — E11.22 TYPE 2 DIABETES MELLITUS WITH DIABETIC CHRONIC KIDNEY DISEASE: ICD-10-CM

## 2024-11-19 DIAGNOSIS — R80.8 OTHER PROTEINURIA: ICD-10-CM

## 2024-11-19 PROCEDURE — 36415 COLL VENOUS BLD VENIPUNCTURE: CPT

## 2024-11-20 LAB
ANION GAP SERPL CALC-SCNC: 12 MMOL/L
BUN SERPL-MCNC: 40 MG/DL
CALCIUM SERPL-MCNC: 9.6 MG/DL
CHLORIDE SERPL-SCNC: 105 MMOL/L
CO2 SERPL-SCNC: 25 MMOL/L
CREAT SERPL-MCNC: 1.98 MG/DL
EGFR: 33 ML/MIN/1.73M2
GLUCOSE SERPL-MCNC: 155 MG/DL
MAGNESIUM SERPL-MCNC: 1.8 MG/DL
OSMOLALITY SERPL: 308 MOSMOL/KG
POTASSIUM SERPL-SCNC: 5.1 MMOL/L
SODIUM SERPL-SCNC: 141 MMOL/L
URATE SERPL-MCNC: 7.4 MG/DL

## 2024-12-13 ENCOUNTER — OFFICE (OUTPATIENT)
Dept: URBAN - METROPOLITAN AREA CLINIC 29 | Facility: CLINIC | Age: 79
Setting detail: OPHTHALMOLOGY
End: 2024-12-13
Payer: MEDICARE

## 2024-12-13 DIAGNOSIS — H43.812: ICD-10-CM

## 2024-12-13 DIAGNOSIS — Z96.1: ICD-10-CM

## 2024-12-13 DIAGNOSIS — H11.153: ICD-10-CM

## 2024-12-13 DIAGNOSIS — H43.393: ICD-10-CM

## 2024-12-13 DIAGNOSIS — H40.033: ICD-10-CM

## 2024-12-13 DIAGNOSIS — H25.12: ICD-10-CM

## 2024-12-13 DIAGNOSIS — H35.3131: ICD-10-CM

## 2024-12-13 DIAGNOSIS — E11.3293: ICD-10-CM

## 2024-12-13 DIAGNOSIS — H35.372: ICD-10-CM

## 2024-12-13 DIAGNOSIS — E11.9: ICD-10-CM

## 2024-12-13 DIAGNOSIS — H16.223: ICD-10-CM

## 2024-12-13 DIAGNOSIS — H18.523: ICD-10-CM

## 2024-12-13 PROCEDURE — 99024 POSTOP FOLLOW-UP VISIT: CPT | Performed by: OPHTHALMOLOGY

## 2024-12-13 PROCEDURE — 92136 OPHTHALMIC BIOMETRY: CPT | Mod: 26,LT | Performed by: OPHTHALMOLOGY

## 2024-12-13 ASSESSMENT — REFRACTION_MANIFEST
OS_CYLINDER: SPHERE
OS_VA1: 20/30
OS_CYLINDER: SPHERE
OD_SPHERE: PLANO
OD_VA1: 20/30
OD_ADD: +2.50
OD_CYLINDER: +0.50
OD_SPHERE: +0.50
OD_VA1: 20/30
OD_VA2: 20/30(J2)
OS_SPHERE: -0.25
OD_CYLINDER: SPHERE
OD_CYLINDER: SPHERE
OS_ADD: +2.25
OS_ADD: +2.50
OD_SPHERE: -0.50
OD_VA1: 20/40-2
OS_VA2: 20/30(J2)
OS_VA1: 20/40-1
OD_AXIS: 160
OS_VA2: 20/30(J2)
OD_ADD: +2.25
OS_SPHERE: +0.50
OD_VA2: 20/30(J2)

## 2024-12-13 ASSESSMENT — TEAR BREAK UP TIME (TBUT)
OS_TBUT: T
OD_TBUT: T

## 2024-12-13 ASSESSMENT — REFRACTION_AUTOREFRACTION
OS_AXIS: 176
OD_CYLINDER: +0.75
OS_CYLINDER: +0.50
OD_SPHERE: -1.00
OD_AXIS: 4
OS_SPHERE: -1.75

## 2024-12-13 ASSESSMENT — CONFRONTATIONAL VISUAL FIELD TEST (CVF)
OD_FINDINGS: FULL
OS_FINDINGS: FULL

## 2024-12-13 ASSESSMENT — REFRACTION_CURRENTRX
OD_SPHERE: +2.50
OS_SPHERE: +2.50
OS_CYLINDER: SPH
OS_OVR_VA: 20/
OD_OVR_VA: 20/
OD_CYLINDER: SPH

## 2024-12-13 ASSESSMENT — CORNEAL EDEMA - FOLDS/STRIAE: OD_FOLDSSTRIAE: T

## 2024-12-13 ASSESSMENT — LID EXAM ASSESSMENTS
OD_BLEPHARITIS: T
OS_BLEPHARITIS: T

## 2024-12-13 ASSESSMENT — VISUAL ACUITY
OD_BCVA: 20/80-2
OS_BCVA: 20/40-2

## 2025-01-03 ENCOUNTER — OFFICE (OUTPATIENT)
Dept: URBAN - METROPOLITAN AREA CLINIC 29 | Facility: CLINIC | Age: 80
Setting detail: OPHTHALMOLOGY
End: 2025-01-03
Payer: MEDICARE

## 2025-01-03 ENCOUNTER — RX ONLY (RX ONLY)
Age: 80
End: 2025-01-03

## 2025-01-03 DIAGNOSIS — H11.153: ICD-10-CM

## 2025-01-03 DIAGNOSIS — H16.223: ICD-10-CM

## 2025-01-03 DIAGNOSIS — H25.12: ICD-10-CM

## 2025-01-03 DIAGNOSIS — H16.141: ICD-10-CM

## 2025-01-03 DIAGNOSIS — H18.523: ICD-10-CM

## 2025-01-03 PROCEDURE — 99213 OFFICE O/P EST LOW 20 MIN: CPT | Mod: 24 | Performed by: OPHTHALMOLOGY

## 2025-01-03 ASSESSMENT — REFRACTION_MANIFEST
OD_VA2: 20/30(J2)
OS_VA2: 20/30(J2)
OS_SPHERE: +0.50
OD_SPHERE: PLANO
OD_VA1: 20/40-2
OS_ADD: +2.25
OD_SPHERE: +0.50
OD_AXIS: 160
OS_CYLINDER: SPHERE
OS_ADD: +2.50
OD_ADD: +2.25
OD_VA1: 20/30
OD_CYLINDER: +0.50
OD_CYLINDER: SPHERE
OD_VA2: 20/30(J2)
OS_VA1: 20/30
OS_VA2: 20/30(J2)
OD_VA1: 20/30
OD_ADD: +2.50
OS_VA1: 20/40-1
OD_CYLINDER: SPHERE
OS_SPHERE: -0.25
OD_SPHERE: -0.50
OS_CYLINDER: SPHERE

## 2025-01-03 ASSESSMENT — REFRACTION_AUTOREFRACTION
OS_AXIS: 176
OD_AXIS: 4
OS_CYLINDER: +0.50
OD_CYLINDER: +0.75
OS_SPHERE: -1.75
OD_SPHERE: -1.00

## 2025-01-03 ASSESSMENT — CORNEAL EDEMA - FOLDS/STRIAE: OD_FOLDSSTRIAE: ABSENT

## 2025-01-03 ASSESSMENT — REFRACTION_CURRENTRX
OS_OVR_VA: 20/
OD_OVR_VA: 20/
OS_CYLINDER: SPH
OD_SPHERE: +2.50
OS_SPHERE: +2.50
OD_CYLINDER: SPH

## 2025-01-03 ASSESSMENT — TONOMETRY: OD_IOP_MMHG: 15

## 2025-01-03 ASSESSMENT — VISUAL ACUITY
OS_BCVA: 20/50
OD_BCVA: 20/80-2

## 2025-01-03 ASSESSMENT — TEAR BREAK UP TIME (TBUT)
OD_TBUT: 2+ 3+
OS_TBUT: T

## 2025-01-03 ASSESSMENT — CONFRONTATIONAL VISUAL FIELD TEST (CVF)
OD_FINDINGS: FULL
OS_FINDINGS: FULL

## 2025-01-03 ASSESSMENT — SUPERFICIAL PUNCTATE KERATITIS (SPK): OD_SPK: 1+

## 2025-01-03 ASSESSMENT — LID EXAM ASSESSMENTS
OS_BLEPHARITIS: T
OD_BLEPHARITIS: T

## 2025-02-13 ENCOUNTER — APPOINTMENT (OUTPATIENT)
Dept: NEPHROLOGY | Facility: CLINIC | Age: 80
End: 2025-02-13

## 2025-04-03 ENCOUNTER — RESULT REVIEW (OUTPATIENT)
Age: 80
End: 2025-04-03

## 2025-05-08 ENCOUNTER — OFFICE (OUTPATIENT)
Dept: URBAN - METROPOLITAN AREA CLINIC 92 | Facility: CLINIC | Age: 80
Setting detail: OPHTHALMOLOGY
End: 2025-05-08
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
## 2025-05-08 PROCEDURE — NO SHOW FE NO SHOW FEE: Performed by: OPHTHALMOLOGY

## 2025-05-14 ENCOUNTER — APPOINTMENT (OUTPATIENT)
Facility: CLINIC | Age: 80
End: 2025-05-14

## 2025-05-28 ENCOUNTER — OFFICE (OUTPATIENT)
Dept: URBAN - METROPOLITAN AREA CLINIC 29 | Facility: CLINIC | Age: 80
Setting detail: OPHTHALMOLOGY
End: 2025-05-28
Payer: MEDICARE

## 2025-05-28 DIAGNOSIS — H16.223: ICD-10-CM

## 2025-05-28 PROCEDURE — 92012 INTRM OPH EXAM EST PATIENT: CPT | Performed by: OPTOMETRIST

## 2025-05-28 ASSESSMENT — REFRACTION_MANIFEST
OS_CYLINDER: SPHERE
OS_ADD: +2.50
OD_AXIS: 160
OD_CYLINDER: SPHERE
OS_SPHERE: +0.50
OS_VA2: 20/30(J2)
OS_SPHERE: -0.25
OD_SPHERE: PLANO
OD_ADD: +2.25
OS_VA1: 20/40-1
OD_VA1: 20/40-2
OD_SPHERE: -0.50
OD_VA2: 20/30(J2)
OD_CYLINDER: +0.50
OD_VA1: 20/30
OS_VA1: 20/30
OS_ADD: +2.25
OD_CYLINDER: SPHERE
OD_SPHERE: +0.50
OD_VA2: 20/30(J2)
OS_VA2: 20/30(J2)
OD_VA1: 20/30
OD_ADD: +2.50
OS_CYLINDER: SPHERE

## 2025-05-28 ASSESSMENT — TEAR BREAK UP TIME (TBUT)
OS_TBUT: T
OD_TBUT: 2+ 3+

## 2025-05-28 ASSESSMENT — REFRACTION_AUTOREFRACTION
OD_CYLINDER: +0.75
OD_SPHERE: -1.00
OS_AXIS: 176
OS_SPHERE: -1.75
OS_CYLINDER: +0.50
OD_AXIS: 4

## 2025-05-28 ASSESSMENT — VISUAL ACUITY
OD_BCVA: 20/100
OS_BCVA: 20/40-2

## 2025-05-28 ASSESSMENT — CORNEAL EDEMA - FOLDS/STRIAE: OD_FOLDSSTRIAE: ABSENT

## 2025-05-28 ASSESSMENT — CONFRONTATIONAL VISUAL FIELD TEST (CVF)
OS_FINDINGS: FULL
OD_FINDINGS: FULL

## 2025-05-28 ASSESSMENT — LID EXAM ASSESSMENTS
OS_BLEPHARITIS: T
OD_BLEPHARITIS: T

## 2025-05-28 ASSESSMENT — TONOMETRY
OS_IOP_MMHG: 15
OD_IOP_MMHG: 16

## 2025-05-28 ASSESSMENT — REFRACTION_CURRENTRX
OS_CYLINDER: SPH
OD_SPHERE: +2.50
OD_CYLINDER: SPH
OS_OVR_VA: 20/
OD_OVR_VA: 20/
OS_SPHERE: +2.50

## 2025-05-28 ASSESSMENT — SUPERFICIAL PUNCTATE KERATITIS (SPK): OD_SPK: 1+

## 2025-09-09 ENCOUNTER — NON-APPOINTMENT (OUTPATIENT)
Age: 80
End: 2025-09-09

## 2025-09-09 ENCOUNTER — APPOINTMENT (OUTPATIENT)
Facility: CLINIC | Age: 80
End: 2025-09-09
Payer: MEDICARE

## 2025-09-09 PROCEDURE — 92020 GONIOSCOPY: CPT

## 2025-09-09 PROCEDURE — 92134 CPTRZ OPH DX IMG PST SGM RTA: CPT

## 2025-09-09 PROCEDURE — 92004 COMPRE OPH EXAM NEW PT 1/>: CPT
